# Patient Record
Sex: MALE | Race: WHITE | NOT HISPANIC OR LATINO | Employment: OTHER | URBAN - METROPOLITAN AREA
[De-identification: names, ages, dates, MRNs, and addresses within clinical notes are randomized per-mention and may not be internally consistent; named-entity substitution may affect disease eponyms.]

---

## 2017-07-24 ENCOUNTER — RESOLUTE PROFESSIONAL BILLING HOSPITAL PROF FEE (OUTPATIENT)
Dept: HOSPITALIST | Facility: MEDICAL CENTER | Age: 77
End: 2017-07-24
Payer: MEDICARE

## 2017-07-24 ENCOUNTER — HOSPITAL ENCOUNTER (INPATIENT)
Facility: MEDICAL CENTER | Age: 77
LOS: 7 days | DRG: 064 | End: 2017-07-31
Attending: EMERGENCY MEDICINE | Admitting: HOSPITALIST
Payer: MEDICARE

## 2017-07-24 ENCOUNTER — HOSPITAL ENCOUNTER (OUTPATIENT)
Dept: RADIOLOGY | Facility: MEDICAL CENTER | Age: 77
End: 2017-07-24

## 2017-07-24 ENCOUNTER — APPOINTMENT (OUTPATIENT)
Dept: RADIOLOGY | Facility: MEDICAL CENTER | Age: 77
DRG: 064 | End: 2017-07-24
Attending: EMERGENCY MEDICINE
Payer: MEDICARE

## 2017-07-24 DIAGNOSIS — R56.9 SEIZURE (HCC): ICD-10-CM

## 2017-07-24 DIAGNOSIS — R41.82 ALTERED MENTAL STATUS, UNSPECIFIED ALTERED MENTAL STATUS TYPE: ICD-10-CM

## 2017-07-24 DIAGNOSIS — S06.4XAA EPIDURAL HEMATOMA (HCC): ICD-10-CM

## 2017-07-24 PROBLEM — D72.829 LEUKOCYTOSIS: Status: ACTIVE | Noted: 2017-07-24

## 2017-07-24 PROBLEM — G40.909 SEIZURE DISORDER (HCC): Status: ACTIVE | Noted: 2017-07-24

## 2017-07-24 PROBLEM — D75.89 MACROCYTOSIS WITHOUT ANEMIA: Status: ACTIVE | Noted: 2017-07-24

## 2017-07-24 PROBLEM — R31.29 HEMATURIA, MICROSCOPIC: Status: ACTIVE | Noted: 2017-07-24

## 2017-07-24 PROBLEM — I10 HYPERTENSION: Status: ACTIVE | Noted: 2017-07-24

## 2017-07-24 PROBLEM — E78.5 DYSLIPIDEMIA: Status: ACTIVE | Noted: 2017-07-24

## 2017-07-24 PROBLEM — F02.80 ALZHEIMER'S DEMENTIA (HCC): Status: ACTIVE | Noted: 2017-07-24

## 2017-07-24 PROBLEM — R73.9 HYPERGLYCEMIA: Status: ACTIVE | Noted: 2017-07-24

## 2017-07-24 PROBLEM — G30.9 ALZHEIMER'S DEMENTIA (HCC): Status: ACTIVE | Noted: 2017-07-24

## 2017-07-24 PROBLEM — E87.20 LACTIC ACIDOSIS: Status: ACTIVE | Noted: 2017-07-24

## 2017-07-24 PROBLEM — R80.9 PROTEINURIA: Status: ACTIVE | Noted: 2017-07-24

## 2017-07-24 LAB
ALBUMIN SERPL BCP-MCNC: 4 G/DL (ref 3.2–4.9)
ALBUMIN/GLOB SERPL: 1.4 G/DL
ALP SERPL-CCNC: 102 U/L (ref 30–99)
ALT SERPL-CCNC: 11 U/L (ref 2–50)
ANION GAP SERPL CALC-SCNC: 10 MMOL/L (ref 0–11.9)
APPEARANCE UR: CLEAR
APTT PPP: 24 SEC (ref 24.7–36)
AST SERPL-CCNC: 19 U/L (ref 12–45)
BACTERIA #/AREA URNS HPF: NEGATIVE /HPF
BASOPHILS # BLD AUTO: 0.2 % (ref 0–1.8)
BASOPHILS # BLD: 0.03 K/UL (ref 0–0.12)
BILIRUB SERPL-MCNC: 0.5 MG/DL (ref 0.1–1.5)
BILIRUB UR QL STRIP.AUTO: NEGATIVE
BUN SERPL-MCNC: 18 MG/DL (ref 8–22)
CALCIUM SERPL-MCNC: 8.8 MG/DL (ref 8.5–10.5)
CFT BLD TEG: 3 MIN (ref 5–10)
CHLORIDE SERPL-SCNC: 106 MMOL/L (ref 96–112)
CLOT ANGLE BLD TEG: 64.4 DEGREES (ref 53–72)
CLOT LYSIS 30M P MA LENFR BLD TEG: 0 % (ref 0–8)
CO2 SERPL-SCNC: 21 MMOL/L (ref 20–33)
COLOR UR: YELLOW
CREAT SERPL-MCNC: 1 MG/DL (ref 0.5–1.4)
CT.EXTRINSIC BLD ROTEM: 1.7 MIN (ref 1–3)
CULTURE IF INDICATED INDCX: NO UA CULTURE
EKG IMPRESSION: NORMAL
EOSINOPHIL # BLD AUTO: 0 K/UL (ref 0–0.51)
EOSINOPHIL NFR BLD: 0 % (ref 0–6.9)
EPI CELLS #/AREA URNS HPF: NEGATIVE /HPF
ERYTHROCYTE [DISTWIDTH] IN BLOOD BY AUTOMATED COUNT: 49.8 FL (ref 35.9–50)
GFR SERPL CREATININE-BSD FRML MDRD: >60 ML/MIN/1.73 M 2
GLOBULIN SER CALC-MCNC: 2.8 G/DL (ref 1.9–3.5)
GLUCOSE BLD-MCNC: 162 MG/DL (ref 65–99)
GLUCOSE SERPL-MCNC: 167 MG/DL (ref 65–99)
GLUCOSE UR STRIP.AUTO-MCNC: 100 MG/DL
HCT VFR BLD AUTO: 43.7 % (ref 42–52)
HGB BLD-MCNC: 14.6 G/DL (ref 14–18)
HYALINE CASTS #/AREA URNS LPF: ABNORMAL /LPF
IMM GRANULOCYTES # BLD AUTO: 0.06 K/UL (ref 0–0.11)
IMM GRANULOCYTES NFR BLD AUTO: 0.5 % (ref 0–0.9)
INR PPP: 1.05 (ref 0.87–1.13)
KETONES UR STRIP.AUTO-MCNC: NEGATIVE MG/DL
LACTATE BLD-SCNC: 4.7 MMOL/L (ref 0.5–2)
LEUKOCYTE ESTERASE UR QL STRIP.AUTO: NEGATIVE
LYMPHOCYTES # BLD AUTO: 0.72 K/UL (ref 1–4.8)
LYMPHOCYTES NFR BLD: 5.7 % (ref 22–41)
MAGNESIUM SERPL-MCNC: 1.9 MG/DL (ref 1.5–2.5)
MCF BLD TEG: 64.7 MM (ref 50–70)
MCH RBC QN AUTO: 34.1 PG (ref 27–33)
MCHC RBC AUTO-ENTMCNC: 33.4 G/DL (ref 33.7–35.3)
MCV RBC AUTO: 102.1 FL (ref 81.4–97.8)
MICRO URNS: ABNORMAL
MONOCYTES # BLD AUTO: 1.12 K/UL (ref 0–0.85)
MONOCYTES NFR BLD AUTO: 8.9 % (ref 0–13.4)
NEUTROPHILS # BLD AUTO: 10.63 K/UL (ref 1.82–7.42)
NEUTROPHILS NFR BLD: 84.7 % (ref 44–72)
NITRITE UR QL STRIP.AUTO: NEGATIVE
NRBC # BLD AUTO: 0 K/UL
NRBC BLD AUTO-RTO: 0 /100 WBC
PH UR STRIP.AUTO: 5.5 [PH]
PHENYTOIN SERPL-MCNC: 10.9 UG/ML (ref 10–20)
PLATELET # BLD AUTO: 196 K/UL (ref 164–446)
PMV BLD AUTO: 10 FL (ref 9–12.9)
POTASSIUM SERPL-SCNC: 3.8 MMOL/L (ref 3.6–5.5)
PROT SERPL-MCNC: 6.8 G/DL (ref 6–8.2)
PROT UR QL STRIP: 30 MG/DL
PROTHROMBIN TIME: 14 SEC (ref 12–14.6)
RBC # BLD AUTO: 4.28 M/UL (ref 4.7–6.1)
RBC # URNS HPF: ABNORMAL /HPF
RBC UR QL AUTO: ABNORMAL
SODIUM SERPL-SCNC: 137 MMOL/L (ref 135–145)
SP GR UR STRIP.AUTO: 1.02
TEG ALGORITHM TGALG: ABNORMAL
TSH SERPL DL<=0.005 MIU/L-ACNC: 4.83 UIU/ML (ref 0.3–3.7)
UROBILINOGEN UR STRIP.AUTO-MCNC: 0.2 MG/DL
WBC # BLD AUTO: 12.6 K/UL (ref 4.8–10.8)
WBC #/AREA URNS HPF: ABNORMAL /HPF

## 2017-07-24 PROCEDURE — 87040 BLOOD CULTURE FOR BACTERIA: CPT | Mod: 91

## 2017-07-24 PROCEDURE — 83735 ASSAY OF MAGNESIUM: CPT

## 2017-07-24 PROCEDURE — 304562 HCHG STAT O2 MASK/CANNULA

## 2017-07-24 PROCEDURE — 700105 HCHG RX REV CODE 258: Performed by: EMERGENCY MEDICINE

## 2017-07-24 PROCEDURE — 82962 GLUCOSE BLOOD TEST: CPT

## 2017-07-24 PROCEDURE — 71010 DX-CHEST-PORTABLE (1 VIEW): CPT

## 2017-07-24 PROCEDURE — 85347 COAGULATION TIME ACTIVATED: CPT

## 2017-07-24 PROCEDURE — 85730 THROMBOPLASTIN TIME PARTIAL: CPT

## 2017-07-24 PROCEDURE — 85384 FIBRINOGEN ACTIVITY: CPT

## 2017-07-24 PROCEDURE — 72125 CT NECK SPINE W/O DYE: CPT

## 2017-07-24 PROCEDURE — 700101 HCHG RX REV CODE 250: Performed by: HOSPITALIST

## 2017-07-24 PROCEDURE — 51702 INSERT TEMP BLADDER CATH: CPT

## 2017-07-24 PROCEDURE — 96365 THER/PROPH/DIAG IV INF INIT: CPT

## 2017-07-24 PROCEDURE — 85610 PROTHROMBIN TIME: CPT

## 2017-07-24 PROCEDURE — 99291 CRITICAL CARE FIRST HOUR: CPT

## 2017-07-24 PROCEDURE — 83605 ASSAY OF LACTIC ACID: CPT

## 2017-07-24 PROCEDURE — 93005 ELECTROCARDIOGRAM TRACING: CPT | Performed by: EMERGENCY MEDICINE

## 2017-07-24 PROCEDURE — 80185 ASSAY OF PHENYTOIN TOTAL: CPT

## 2017-07-24 PROCEDURE — 96375 TX/PRO/DX INJ NEW DRUG ADDON: CPT

## 2017-07-24 PROCEDURE — 99223 1ST HOSP IP/OBS HIGH 75: CPT | Performed by: HOSPITALIST

## 2017-07-24 PROCEDURE — 81001 URINALYSIS AUTO W/SCOPE: CPT

## 2017-07-24 PROCEDURE — 770022 HCHG ROOM/CARE - ICU (200)

## 2017-07-24 PROCEDURE — 700101 HCHG RX REV CODE 250: Performed by: EMERGENCY MEDICINE

## 2017-07-24 PROCEDURE — 700111 HCHG RX REV CODE 636 W/ 250 OVERRIDE (IP): Performed by: EMERGENCY MEDICINE

## 2017-07-24 PROCEDURE — 84443 ASSAY THYROID STIM HORMONE: CPT

## 2017-07-24 PROCEDURE — 85576 BLOOD PLATELET AGGREGATION: CPT

## 2017-07-24 PROCEDURE — 303105 HCHG CATHETER EXTRA

## 2017-07-24 PROCEDURE — 80053 COMPREHEN METABOLIC PANEL: CPT

## 2017-07-24 PROCEDURE — 700105 HCHG RX REV CODE 258: Performed by: HOSPITALIST

## 2017-07-24 PROCEDURE — 700111 HCHG RX REV CODE 636 W/ 250 OVERRIDE (IP)

## 2017-07-24 PROCEDURE — 85025 COMPLETE CBC W/AUTO DIFF WBC: CPT

## 2017-07-24 PROCEDURE — 700111 HCHG RX REV CODE 636 W/ 250 OVERRIDE (IP): Performed by: HOSPITALIST

## 2017-07-24 RX ORDER — HYDRALAZINE HYDROCHLORIDE 20 MG/ML
10-20 INJECTION INTRAMUSCULAR; INTRAVENOUS EVERY 4 HOURS PRN
Status: DISCONTINUED | OUTPATIENT
Start: 2017-07-24 | End: 2017-07-31 | Stop reason: HOSPADM

## 2017-07-24 RX ORDER — LORAZEPAM 2 MG/ML
0.5 INJECTION INTRAMUSCULAR EVERY 6 HOURS PRN
Status: DISCONTINUED | OUTPATIENT
Start: 2017-07-24 | End: 2017-07-29

## 2017-07-24 RX ORDER — PHENYTOIN SODIUM 100 MG/1
600 CAPSULE, EXTENDED RELEASE ORAL EVERY EVENING
COMMUNITY

## 2017-07-24 RX ORDER — POLYETHYLENE GLYCOL 3350 17 G/17G
1 POWDER, FOR SOLUTION ORAL
Status: DISCONTINUED | OUTPATIENT
Start: 2017-07-24 | End: 2017-07-31 | Stop reason: HOSPADM

## 2017-07-24 RX ORDER — ONDANSETRON 2 MG/ML
4 INJECTION INTRAMUSCULAR; INTRAVENOUS EVERY 4 HOURS PRN
Status: DISCONTINUED | OUTPATIENT
Start: 2017-07-24 | End: 2017-07-31 | Stop reason: HOSPADM

## 2017-07-24 RX ORDER — ACETAMINOPHEN 325 MG/1
650 TABLET ORAL EVERY 6 HOURS PRN
Status: DISCONTINUED | OUTPATIENT
Start: 2017-07-24 | End: 2017-07-31 | Stop reason: HOSPADM

## 2017-07-24 RX ORDER — MEMANTINE HYDROCHLORIDE 10 MG/1
10 TABLET ORAL 2 TIMES DAILY
COMMUNITY

## 2017-07-24 RX ORDER — LOSARTAN POTASSIUM 100 MG/1
100 TABLET ORAL DAILY
COMMUNITY

## 2017-07-24 RX ORDER — AMLODIPINE BESYLATE 10 MG/1
10 TABLET ORAL DAILY
COMMUNITY

## 2017-07-24 RX ORDER — ATORVASTATIN CALCIUM 80 MG/1
80 TABLET, FILM COATED ORAL
Status: DISCONTINUED | OUTPATIENT
Start: 2017-07-24 | End: 2017-07-31 | Stop reason: HOSPADM

## 2017-07-24 RX ORDER — MIRTAZAPINE 15 MG/1
30 TABLET, FILM COATED ORAL
COMMUNITY

## 2017-07-24 RX ORDER — LORAZEPAM 1 MG/1
0.5 TABLET ORAL EVERY 6 HOURS PRN
Status: DISCONTINUED | OUTPATIENT
Start: 2017-07-24 | End: 2017-07-29

## 2017-07-24 RX ORDER — LORAZEPAM 2 MG/ML
0.5 INJECTION INTRAMUSCULAR ONCE
Status: COMPLETED | OUTPATIENT
Start: 2017-07-24 | End: 2017-07-24

## 2017-07-24 RX ORDER — ONDANSETRON 4 MG/1
4 TABLET, ORALLY DISINTEGRATING ORAL EVERY 4 HOURS PRN
Status: DISCONTINUED | OUTPATIENT
Start: 2017-07-24 | End: 2017-07-31 | Stop reason: HOSPADM

## 2017-07-24 RX ORDER — MIRTAZAPINE 15 MG/1
30 TABLET, FILM COATED ORAL
Status: DISCONTINUED | OUTPATIENT
Start: 2017-07-24 | End: 2017-07-31 | Stop reason: HOSPADM

## 2017-07-24 RX ORDER — AMOXICILLIN 250 MG
2 CAPSULE ORAL 2 TIMES DAILY
Status: DISCONTINUED | OUTPATIENT
Start: 2017-07-24 | End: 2017-07-31 | Stop reason: HOSPADM

## 2017-07-24 RX ORDER — UBIDECARENONE 75 MG
100 CAPSULE ORAL DAILY
COMMUNITY

## 2017-07-24 RX ORDER — ENALAPRILAT 1.25 MG/ML
1.25 INJECTION INTRAVENOUS EVERY 6 HOURS PRN
Status: DISCONTINUED | OUTPATIENT
Start: 2017-07-24 | End: 2017-07-26

## 2017-07-24 RX ORDER — SODIUM CHLORIDE 9 MG/ML
INJECTION, SOLUTION INTRAVENOUS CONTINUOUS
Status: DISCONTINUED | OUTPATIENT
Start: 2017-07-24 | End: 2017-07-25

## 2017-07-24 RX ORDER — FAMOTIDINE 20 MG/1
20 TABLET, FILM COATED ORAL 2 TIMES DAILY
Status: DISCONTINUED | OUTPATIENT
Start: 2017-07-24 | End: 2017-07-25 | Stop reason: ALTCHOICE

## 2017-07-24 RX ORDER — SODIUM CHLORIDE 9 MG/ML
1000 INJECTION, SOLUTION INTRAVENOUS ONCE
Status: COMPLETED | OUTPATIENT
Start: 2017-07-24 | End: 2017-07-24

## 2017-07-24 RX ORDER — LORAZEPAM 2 MG/ML
INJECTION INTRAMUSCULAR
Status: COMPLETED
Start: 2017-07-24 | End: 2017-07-24

## 2017-07-24 RX ORDER — MEMANTINE HYDROCHLORIDE 10 MG/1
10 TABLET ORAL 2 TIMES DAILY
Status: DISCONTINUED | OUTPATIENT
Start: 2017-07-24 | End: 2017-07-31 | Stop reason: HOSPADM

## 2017-07-24 RX ORDER — BISACODYL 10 MG
10 SUPPOSITORY, RECTAL RECTAL
Status: DISCONTINUED | OUTPATIENT
Start: 2017-07-24 | End: 2017-07-31 | Stop reason: HOSPADM

## 2017-07-24 RX ORDER — OMEPRAZOLE 20 MG/1
20 CAPSULE, DELAYED RELEASE ORAL DAILY
COMMUNITY

## 2017-07-24 RX ORDER — ATORVASTATIN CALCIUM 80 MG/1
80 TABLET, FILM COATED ORAL DAILY
COMMUNITY

## 2017-07-24 RX ORDER — LABETALOL HYDROCHLORIDE 5 MG/ML
10 INJECTION, SOLUTION INTRAVENOUS ONCE
Status: COMPLETED | OUTPATIENT
Start: 2017-07-24 | End: 2017-07-24

## 2017-07-24 RX ORDER — OMEPRAZOLE 20 MG/1
20 CAPSULE, DELAYED RELEASE ORAL DAILY
Status: DISCONTINUED | OUTPATIENT
Start: 2017-07-24 | End: 2017-07-31 | Stop reason: HOSPADM

## 2017-07-24 RX ORDER — LEVETIRACETAM 500 MG/1
500 TABLET ORAL 2 TIMES DAILY
Status: ON HOLD | COMMUNITY
End: 2017-07-29

## 2017-07-24 RX ADMIN — SODIUM CHLORIDE 5 MG/HR: 9 INJECTION, SOLUTION INTRAVENOUS at 15:43

## 2017-07-24 RX ADMIN — FAMOTIDINE 20 MG: 10 INJECTION INTRAVENOUS at 20:38

## 2017-07-24 RX ADMIN — DEXTROSE MONOHYDRATE 500 MG: 50 INJECTION, SOLUTION INTRAVENOUS at 20:38

## 2017-07-24 RX ADMIN — SODIUM CHLORIDE 1000 ML: 9 INJECTION, SOLUTION INTRAVENOUS at 14:45

## 2017-07-24 RX ADMIN — LABETALOL HYDROCHLORIDE 10 MG: 5 INJECTION INTRAVENOUS at 15:20

## 2017-07-24 RX ADMIN — SODIUM CHLORIDE 10 MG/HR: 9 INJECTION, SOLUTION INTRAVENOUS at 19:13

## 2017-07-24 RX ADMIN — LORAZEPAM 0.5 MG: 2 INJECTION INTRAMUSCULAR; INTRAVENOUS at 13:24

## 2017-07-24 RX ADMIN — PHENYTOIN SODIUM 300 MG: 50 INJECTION INTRAMUSCULAR; INTRAVENOUS at 21:43

## 2017-07-24 RX ADMIN — LORAZEPAM 0.5 MG: 2 INJECTION INTRAMUSCULAR; INTRAVENOUS at 22:04

## 2017-07-24 RX ADMIN — SODIUM CHLORIDE 12.5 MG/HR: 9 INJECTION, SOLUTION INTRAVENOUS at 21:20

## 2017-07-24 RX ADMIN — SODIUM CHLORIDE 15 MG/HR: 9 INJECTION, SOLUTION INTRAVENOUS at 23:08

## 2017-07-24 RX ADMIN — SODIUM CHLORIDE: 9 INJECTION, SOLUTION INTRAVENOUS at 17:10

## 2017-07-24 RX ADMIN — LORAZEPAM 0.5 MG: 2 INJECTION INTRAMUSCULAR at 13:53

## 2017-07-24 RX ADMIN — LORAZEPAM 0.5 MG: 2 INJECTION INTRAMUSCULAR; INTRAVENOUS at 13:53

## 2017-07-24 ASSESSMENT — PAIN SCALES - GENERAL: PAINLEVEL_OUTOF10: ASSUMED PAIN PRESENT

## 2017-07-24 ASSESSMENT — COPD QUESTIONNAIRES
DURING THE PAST 4 WEEKS HOW MUCH DID YOU FEEL SHORT OF BREATH: NONE/LITTLE OF THE TIME
COPD SCREENING SCORE: 4
HAVE YOU SMOKED AT LEAST 100 CIGARETTES IN YOUR ENTIRE LIFE: YES
DO YOU EVER COUGH UP ANY MUCUS OR PHLEGM?: NO/ONLY WITH OCCASIONAL COLDS OR INFECTIONS

## 2017-07-24 ASSESSMENT — LIFESTYLE VARIABLES: EVER_SMOKED: YES

## 2017-07-24 ASSESSMENT — ENCOUNTER SYMPTOMS
SEIZURES: 1
NECK PAIN: 1

## 2017-07-24 NOTE — ED PROVIDER NOTES
ED Provider Note    Scribed for George Moreno M.D. by Eric Sampson. 7/24/2017, 12:56 PM.    Primary care provider: None noted  Means of arrival: EMS  History obtained from: EMS and patient  History limited by: Altered level of consciousness    CHIEF COMPLAINT  Chief Complaint   Patient presents with   • ALOC       HPI  Alex Bond is a 77 y.o. Male with a history of seizures who presents to the Emergency Department via EMS for evaluation after having seizures just prior to arrival. Per medical records, patient had witnessed seizure in bed causing him to slip off of the bed. It is unclear if he hit his head. Patient was at [facility] for epidural hematoma. He currently complains of associated neck pain onset one hour ago. Patient appears confused and cannot recall what happened that caused the neck pain. He also reports generalized body pain. Patient is on dilantin for the seizures. He also has history of hypertension, hyperlipidemia, and previous CVA.    History is limited secondary to patient's altered level of consciousness.    REVIEW OF SYSTEMS  Review of Systems   Constitutional:        Positive generalized body pain   Musculoskeletal: Positive for neck pain.   Neurological: Positive for seizures.        Positive confusion     C    ROS is limited secondary to patient's altered level of consciousness.    PAST MEDICAL HISTORY   has a past medical history of Seizure disorder (CMS-HCC); Hypertension; High cholesterol; and Alzheimer disease.History of seizures    SURGICAL HISTORY  patient denies any surgical history    SOCIAL HISTORY  None noted    FAMILY HISTORY  None noted    CURRENT MEDICATIONS  Current medications can be seen on the nurse's note.   No current facility-administered medications on file prior to encounter.     No current outpatient prescriptions on file prior to encounter.     Location of the nurse's notes and have been reviewed.      ALLERGIES  Allergies   Allergen  "Reactions   • Other Misc      Hay fever       PHYSICAL EXAM  VITAL SIGNS: /110 mmHg  Pulse 108  Resp 20  Ht 1.829 m (6' 0.01\")  Wt 90.719 kg (200 lb)  BMI 27.12 kg/m2  Vitals reviewed.  Constitutional: Awake, alert, chronically ill-appearing. No acute distress.  Appears uncomfortable  HENT: Normocephalic, Atraumatic, Bilateral external ears normal, Oropharynx moist, No oral exudates, Nose normal.   Eyes: PERRL, EOMI, Conjunctiva normal, No discharge.   Neck: Midline tenderness complaining of pain  Cardiovascular: Normal heart rate, Normal rhythm, No murmurs, No rubs, No gallops.   Thorax & Lungs: Normal breath sounds, No respiratory distress, No wheezing, No chest tenderness.   Abdomen: Bowel sounds normal, Soft, No tenderness  Skin: Warm, Dry, No erythema, No rash.   Back: No tenderness, No CVA tenderness.   Musculoskeletal: Erosive 40.  Normal range of motion passively.  Neurologic: Alert, disoriented, moves all extremities does not answer questions and follow commands.  Psychiatric: Affect, agitated    LABS  Results for orders placed or performed during the hospital encounter of 07/24/17   CBC WITH DIFFERENTIAL   Result Value Ref Range    WBC 12.6 (H) 4.8 - 10.8 K/uL    RBC 4.28 (L) 4.70 - 6.10 M/uL    Hemoglobin 14.6 14.0 - 18.0 g/dL    Hematocrit 43.7 42.0 - 52.0 %    .1 (H) 81.4 - 97.8 fL    MCH 34.1 (H) 27.0 - 33.0 pg    MCHC 33.4 (L) 33.7 - 35.3 g/dL    RDW 49.8 35.9 - 50.0 fL    Platelet Count 196 164 - 446 K/uL    MPV 10.0 9.0 - 12.9 fL    Neutrophils-Polys 84.70 (H) 44.00 - 72.00 %    Lymphocytes 5.70 (L) 22.00 - 41.00 %    Monocytes 8.90 0.00 - 13.40 %    Eosinophils 0.00 0.00 - 6.90 %    Basophils 0.20 0.00 - 1.80 %    Immature Granulocytes 0.50 0.00 - 0.90 %    Nucleated RBC 0.00 /100 WBC    Neutrophils (Absolute) 10.63 (H) 1.82 - 7.42 K/uL    Lymphs (Absolute) 0.72 (L) 1.00 - 4.80 K/uL    Monos (Absolute) 1.12 (H) 0.00 - 0.85 K/uL    Eos (Absolute) 0.00 0.00 - 0.51 K/uL    Baso " (Absolute) 0.03 0.00 - 0.12 K/uL    Immature Granulocytes (abs) 0.06 0.00 - 0.11 K/uL    NRBC (Absolute) 0.00 K/uL   COMP METABOLIC PANEL   Result Value Ref Range    Sodium 137 135 - 145 mmol/L    Potassium 3.8 3.6 - 5.5 mmol/L    Chloride 106 96 - 112 mmol/L    Co2 21 20 - 33 mmol/L    Anion Gap 10.0 0.0 - 11.9    Glucose 167 (H) 65 - 99 mg/dL    Bun 18 8 - 22 mg/dL    Creatinine 1.00 0.50 - 1.40 mg/dL    Calcium 8.8 8.5 - 10.5 mg/dL    AST(SGOT) 19 12 - 45 U/L    ALT(SGPT) 11 2 - 50 U/L    Alkaline Phosphatase 102 (H) 30 - 99 U/L    Total Bilirubin 0.5 0.1 - 1.5 mg/dL    Albumin 4.0 3.2 - 4.9 g/dL    Total Protein 6.8 6.0 - 8.2 g/dL    Globulin 2.8 1.9 - 3.5 g/dL    A-G Ratio 1.4 g/dL   LACTIC ACID   Result Value Ref Range    Lactic Acid 4.7 (HH) 0.5 - 2.0 mmol/L   PROTHROMBIN TIME (INR)   Result Value Ref Range    PT 14.0 12.0 - 14.6 sec    INR 1.05 0.87 - 1.13   APTT   Result Value Ref Range    APTT 24.0 (L) 24.7 - 36.0 sec   BASIC TEG   Result Value Ref Range    Reaction Time Initial-R 3.0 (L) 5.0 - 10.0 min    Clot Kinetics-K 1.7 1.0 - 3.0 min    Clot Angle-Angle 64.4 53.0 - 72.0 degrees    Maximum Clot Strength-MA 64.7 50.0 - 70.0 mm    Lysis 30 minutes-LY30 0.0 0.0 - 8.0 %    TEG Algorithm Link Algorithm    DILANTIN   Result Value Ref Range    Phenytoin 10.9 10.0 - 20.0 ug/mL   ESTIMATED GFR   Result Value Ref Range    GFR If African American >60 >60 mL/min/1.73 m 2    GFR If Non African American >60 >60 mL/min/1.73 m 2   EKG (ER)   Result Value Ref Range    Report       [facility] Emergency Dept.    Test Date:  2017  Pt Name:    LYNNE PACE              Department: ER  MRN:        [id number]                      Room:        13  Gender:     M                            Technician: [id number]  :        [id number]                   Requested By:ER TRIAGE PROTOCOL  Order #:    [id number]                    Reading MD:    Measurements  Intervals                                 Axis  Rate:       84                           P:          0  WV:         155                          QRS:        -72  QRSD:       136                          T:          16  QT:         444  QTc:        525    Interpretive Statements  SINUS RHYTHM  RIGHT BUNDLE BRANCH BLOCK  No previous ECG available for comparison        All labs reviewed by me.    EKG Interpretation  Interpreted by me    Rhythm:  Normal sinus rhythm   Rate: 84  Axis: normal  Ectopy: none  Conduction: Right bundle-branch block with secondary changes  ST Segments: Right bundle branch block with secondary changes  T Waves: no acute change  Q Waves: none  Clinical Impression: Normal sinus rhythm with right bundle-branch block and secondary changes     RADIOLOGY  CT-CSPINE WITHOUT PLUS RECONS   Final Result      1.  No acute abnormality identified.   2.  Multilevel multifactorial degenerative changes   3.  Osteopenia   4.  Atherosclerosis   5.  Probable prior RIGHT carotid endarterectomy      DX-CHEST-PORTABLE (1 VIEW)   Final Result      Cardiomegaly.      OUTSIDE IMAGES-CT HEAD   Preliminary Result        The radiologist's interpretation of all radiological studies have been reviewed by me.    COURSE & MEDICAL DECISION MAKING  Pertinent Labs & Imaging studies reviewed. (See chart for details)    Obtained and reviewed past medical records from the transferring hospital.  Including labs, x-rays and workup.    12:56 PM Patient seen and examined at bedside. The patient presents with altered level of consciousness, and the differential diagnosis includes but is not limited to , epidural hematoma, seizure, status epilepticus septicemic abnormalities.  Noncompliance. Ordered for CT C spine, DX chest, dilantin, CBC, CMP, lactic acid, urinalysis culture, INR, APTT, basic TEG, EKG to evaluate. Patient will be treated with ativan 0.5 mg for his symptoms.      12:59 PM Reviewed medical records which states patient has a history of seizures, hypertension,  hyperlipidemia, and previous CVA. He takes dilantin for seizures. Patient had witnessed seizure while on his bed, causing him to slip off of the bed. He was at [facility] for epidural hematoma.     1:05 PM Paged neurosurgery    1:14 PM - I discussed the patient's case and the above findings with Dr. Marti (neurosurgery) who advised admission to ICU. Also advised to discuss patient's case and above findings with trauma.    2:10 PM Paged trauma     2:12 PM I discussed the patient's case and the above findings with trauma .  Dr. Carr is seen and evaluated the patient and recommends admission to the hospitalist.    2:31 PM Paged hospitalist    Patient was transferred here for a epidural hematoma.  I have reviewed these images discussed the case with the trauma surgeon and the neurosurgeon.  The patient will be admitted to the hospitalist.  He is quite agitated given Ativan when necessary.  His blood pressure is noted to be high.  He is given small doses of labetalol.  A  teg was ordered.      The patient did have an elevated lactic acid.  I suspect this is likely secondary to seizures and not sepsis.  Did culture him.  He does not have a leukocytosis or fever, urinalysis and chest x-ray were unremarkable.  Phenytoin is therapeutic.  His artery on Ativan for sedation, which did help minimize risk of further seizures    His neck pain and the neck CT this is unremarkable for fracture.  The patient will be admitted to the hospital setting critical condition.    DISPOSITION:  Patient will be admitted to Dr. Lopez in critical condition.     FINAL IMPRESSION  1. Epidural hematoma (CMS-HCC)    2. Altered mental status, unspecified altered mental status type    3. Seizure (CMS-HCC)    4.  Critical care time of 40 minutes not including procedures      I, Eric Sampson (Lyle), am scribing for, and in the presence of, George Moreno M.D..    Electronically signed by: Eric Sampson (Lyle),  7/24/2017    IGeorge M.D. personally performed the services described in this documentation, as scribed by Eric Sampson in my presence, and it is both accurate and complete.    The note accurately reflects work and decisions made by me.  George Moreno  7/24/2017  4:52 PM

## 2017-07-24 NOTE — H&P
Hospital Medicine History and Physical    Date of Service  2017    Chief Complaint  Chief Complaint   Patient presents with   • ALOC       History of Presenting Illness  77 y.o. male who presented 2017 with altered level of consciousness. Patient is from [place]. He has known Alzheimer's dementia which has been stable he's on vacation with his wife at [place]. The patient suddenly developed altered level of consciousness he does have history of seizures. He will continue at this point with Keppra and Dilantin. On evaluation of his head with a imaging study he is found to have a subdural hematoma. Neurosurgery has evaluated the patient a do not feel that at this point surgery is indicated. Patient will be admitted to ICU for continued neuro watch. MRI of the brain will be requested for morning.    Primary Care Physician  No primary care provider on file.    Consultants  Neurosurgery    Code Status  Full code    Review of Systems  ROS unable to obtain due to patient's current mental status.    Past Medical History  Past Medical History   Diagnosis Date   • Seizure disorder (CMS-HCC)    • Hypertension    • High cholesterol    • Alzheimer disease        Surgical History  History reviewed. No pertinent past surgical history.    Medications  No current facility-administered medications on file prior to encounter.     No current outpatient prescriptions on file prior to encounter.       Family History  History reviewed. No pertinent family history.    Social History  Social History   Substance Use Topics   • Smoking status: Never Smoker    • Smokeless tobacco: None   • Alcohol Use: No       Allergies  Allergies   Allergen Reactions   • Other Misc      Hay fever        Physical Exam  Laboratory   Hemodynamics  Temp (24hrs), Av.5 °C (97.7 °F), Min:36.5 °C (97.7 °F), Max:36.5 °C (97.7 °F)   Temperature: 36.5 °C (97.7 °F)  Pulse  Av.5  Min: 79  Max: 109 Heart Rate (Monitored): 83  Blood Pressure :  149/110 mmHg, NIBP: (!) 173/84 mmHg      Respiratory      Respiration: 16, Pulse Oximetry: 97 %, O2 Daily Delivery Respiratory : Silicone Nasal Cannula     Work Of Breathing / Effort: Mild  RLL Breath Sounds: Diminished, LLL Breath Sounds: Diminished    Physical Exam  Patient currently is not alert not awake not oriented. Head is atraumatic. I have attempted to arouse the patient. It is very difficult for him to open his eyes even with voice. Once I did arouse the patient he was able to move his upper extremities and lower extremities. But he follows commands with just minimal intention.  Eyes do not follow a normal range of gaze. Pupils are equal round and reactive. Sclerae anicteric conjunctiva is within normal hue.  Nose is midline without discharge no nasal fracture known bleeding from the nose.  Ears bilaterally intact without any discharge external ears are normal mastoid tenderness is not appreciated ear canals are patent.  Oral cavity moist mucous membranes no apparent focus of infection the oral cavity poor dentition.  Neck soft supple no JVD carotid bruit with carotid bruits thyroid or lymphadenopathy appreciated. He is in a cervical collar.  Chest wall moves equally with inspiration and expiration of paradoxical motion no reversible chest wall tenderness.  Heart S1-S2 no murmurs or gallops detected.  Lungs clear to auscultation no rales or rhonchi detected.  Abdomen soft bowel sounds present all 4 quadrants no hepatomegaly no splenomegaly no rebound no tenderness elicited.  Genital exam within normal limits  Wells catheter has been inserted.  Rectal exam deferred.  Upper and lower extremities good pulses good muscles tone normal deep tendon reflexes. He is not following commands  Skin normal turgor no rashes or abrasions identified.  Neurologically cranial nerves II-12 could not be fully evaluated as the patient does not cooperate.   Recent Labs      07/24/17   1312   WBC  12.6*   RBC  4.28*   HEMOGLOBIN   14.6   HEMATOCRIT  43.7   MCV  102.1*   MCH  34.1*   MCHC  33.4*   RDW  49.8   PLATELETCT  196   MPV  10.0     Recent Labs      07/24/17   1312   SODIUM  137   POTASSIUM  3.8   CHLORIDE  106   CO2  21   GLUCOSE  167*   BUN  18   CREATININE  1.00   CALCIUM  8.8     Recent Labs      07/24/17   1312   ALTSGPT  11   ASTSGOT  19   ALKPHOSPHAT  102*   TBILIRUBIN  0.5   GLUCOSE  167*     Recent Labs      07/24/17   1312   APTT  24.0*   INR  1.05             No results found for: TROPONINI    Imaging  CT-CSPINE WITHOUT PLUS RECONS   Final Result      1.  No acute abnormality identified.   2.  Multilevel multifactorial degenerative changes   3.  Osteopenia   4.  Atherosclerosis   5.  Probable prior RIGHT carotid endarterectomy      DX-CHEST-PORTABLE (1 VIEW)   Final Result      Cardiomegaly.      OUTSIDE IMAGES-CT HEAD   Preliminary Result         Assessment/Plan     I anticipate this patient will require at least two midnights for appropriate medical management, necessitating inpatient admission.    * Epidural hematoma (CMS-HCC) (present on admission)  Assessment & Plan  Patient will be admitted to the ICU. He will undergo every hour neuro checks. Neurosurgical consultation has been requested currently no intervention is planned. Obtain MRI in the morning. Keep off blood thinners. Monitor for neuro deficits.    Seizure disorder (CMS-HCC) (present on admission)  Assessment & Plan  Continue with Keppra IV and Dilantin IV.  Patient will continue on seizure prophylaxis.  Continue with neuro checks every hour.    Hypertension (present on admission)  Assessment & Plan  Continue with permissive hypertension however control if the blood pressure goes above 160 and do not let her go below 100.    Leukocytosis (present on admission)  Assessment & Plan  Most likely stress reaction monitor white blood cell count.      Lactic acidosis (present on admission)  Assessment & Plan  Do not suspect infectious cause this is most likely  secondary to seizure disorder.    Dyslipidemia (present on admission)  Assessment & Plan  Low-fat low-cholesterol diet and statin.    Alzheimer's dementia (present on admission)  Assessment & Plan  Severe and he is combative with his current condition and I have discussed it with the family in detail.    Hyperglycemia (present on admission)  Assessment & Plan  Monitor blood sugars and if necessary sliding scale with Accu-Cheks will be utilized.    Macrocytosis without anemia (present on admission)  Assessment & Plan  Check B12 and folic acid level.    Hematuria, microscopic (present on admission)  Assessment & Plan  Monitor for worsening will need eventual ultrasound of the bladder and kidneys.    Proteinuria (present on admission)  Assessment & Plan  Monitor urine analysis, continue with ACE inhibitor.      VTE prophylaxis: Sequential .

## 2017-07-24 NOTE — IP AVS SNAPSHOT
7/31/2017    Alex Bond  [address] [place] [address]    Dear Alex:    [facility] wants to ensure your discharge home is safe and you or your loved ones have had all of your questions answered regarding your care after you leave the hospital.    Below is a list of resources and contact information should you have any questions regarding your hospital stay, follow-up instructions, or active medical symptoms.    Questions or Concerns Regarding… Contact   Medical Questions Related to Your Discharge  (7 days a week, 8am-5pm) Contact a Nurse Care Coordinator   [phone]   Medical Questions Not Related to Your Discharge  (24 hours a day / 7 days a week)  Contact the Nurse Health Line   [phone]    Medications or Discharge Instructions Refer to your discharge packet   or contact your [facility] Primary Care Provider   [phone]   Follow-up Appointment(s) Schedule your appointment via [employer]   or contact Scheduling [phone]   Billing Review your statement via [employer]  or contact Billing [phone]   Medical Records Review your records via [employer]   or contact Medical Records [phone]     You may receive a telephone call within two days of discharge. This call is to make certain you understand your discharge instructions and have the opportunity to have any questions answered. You can also easily access your medical information, test results and upcoming appointments via the [employer] free online health management tool. You can learn more and sign up at [email or web address]. For assistance setting up your [employer] account, please call [phone].    Once again, we want to ensure your discharge home is safe and that you have a clear understanding of any next steps in your care. If you have any questions or concerns, please do not hesitate to contact us, we are here for you. Thank you for choosing [facility] for your healthcare needs.    Sincerely,    Your [facility] Healthcare Team

## 2017-07-24 NOTE — ED NOTES
"Pt spouse at bedside. Per spouse pt was in bed and \"slipped\" out of bed to floor, denies that pt hit head. Spouse states pt had seizure. Spouse called 911, spouse states pt had another seizure en route to [facility]. Pt spouse states pt is normally a/o x4 but is \"hard to handle.\" per spouse pt has new onset of dementia.   "

## 2017-07-24 NOTE — IP AVS SNAPSHOT
Questra Access Code: [email or web address]  Expires: 8/30/2017  1:05 PM    Your email address is not on file at [facility].  Email Addresses are required for you to sign up for Questra, please contact [phone] to verify your personal information and to provide your email address prior to attempting to register for Questra.    Alex Varun  [address] [place] [address]    Questra  A secure, online tool to manage your health information     [facility]’s Questra® is a secure, online tool that connects you to your personalized health information from the privacy of your home -- day or night - making it very easy for you to manage your healthcare. Once the activation process is completed, you can even access your medical information using the Questra naun, which is available for free in the Apple Naun store or Google Play store.     To learn more about Questra, visit [email or web address]    There are two levels of access available (as shown below):   My Chart Features  [facility] Primary Care Doctor [facility]  Specialists [facility]  Urgent  Care Non-[facility] Primary Care Doctor   Email your healthcare team securely and privately 24/7 X X X    Manage appointments: schedule your next appointment; view details of past/upcoming appointments X      Request prescription refills. X      View recent personal medical records, including lab and immunizations X X X X   View health record, including health history, allergies, medications X X X X   Read reports about your outpatient visits, procedures, consult and ER notes X X X X   See your discharge summary, which is a recap of your hospital and/or ER visit that includes your diagnosis, lab results, and care plan X X  X     How to register for Questra:  Once your e-mail address has been verified, follow the following steps to sign up for Questra.     1. Go to  [email or web address]  2. Click on the Sign Up Now box, which takes you to the New Member Sign Up page. You will  need to provide the following information:  a. Enter your [employer] Access Code exactly as it appears at the top of this page. (You will not need to use this code after you’ve completed the sign-up process. If you do not sign up before the expiration date, you must request a new code.)   b. Enter your date of birth.   c. Enter your home email address.   d. Click Submit, and follow the next screen’s instructions.  3. Create a Client24t ID. This will be your Fl[employer] login ID and cannot be changed, so think of one that is secure and easy to remember.  4. Create a [employer] password. You can change your password at any time.  5. Enter your Password Reset Question and Answer. This can be used at a later time if you forget your password.   6. Enter your e-mail address. This allows you to receive e-mail notifications when new information is available in [employer].  7. Click Sign Up. You can now view your health information.    For assistance activating your [employer] account, call [phone]

## 2017-07-24 NOTE — ASSESSMENT & PLAN NOTE
"- CT head 07/25/2017 revealing \"Stable appearing left lateral sylvian frontal-temporal hemorrhage most consistent with a small subdural hematoma. Measures 10 mm with no significant change from 7/24/2017. Moderate cerebral atrophy. Old lacunar infarcts. Advanced supratentorial white matter disease most consistent with microvascular ischemic change. Probable old MCA territory infarcts with encephalomalacic change in the right hemisphere primarily in the right frontal operculum. No evidence of new hemorrhage since the prior exam\"  - Continue q4 hour neuro checks  - No surgical interventions per NS team  - Stat CT head if any change in neurological status  - Outpatient CT in two week with PCP  - Keep SBP < 150  - Hold ASA until repeat CT head in outpatient setting  "

## 2017-07-24 NOTE — ASSESSMENT & PLAN NOTE
- Would check B12 / Folate but patient refusing blood draws  - With underlying dementia would recommend this  - Recommend PCP review the case

## 2017-07-24 NOTE — ASSESSMENT & PLAN NOTE
- Increased Keppra to 750 mg BID  - Continue phenytoin ER  - Outpatient neurology evaluation post discharge

## 2017-07-24 NOTE — CONSULTS
TRAUMA HISTORY AND PHYSICAL    CHIEF COMPLAINT: Epidural hematoma    HISTORY OF PRESENT ILLNESS: The patient is a 77-year-old man who was not triaged as a trauma. He has a known history of seizures. He apparently had a seizure and rolled out of bed earlier today and fell out of the bed. It's unclear whether or not he hit his head but an outside CT did reveal an epidural hematoma. He is transferred here for further care..     PAST MEDICAL HISTORY:  has a past medical history of Seizure disorder (CMS-HCC); Hypertension; and High cholesterol.     PAST SURGICAL HISTORY: Unknown     ALLERGIES: NKMA.     CURRENT MEDICATIONS:    Home Medications     Reviewed by Lata Berman, Pharmacy Int (Pharmacy Intern) on 07/24/17 at 1413  Med List Status: Complete    Medication Last Dose Status    amlodipine (NORVASC) 10 MG Tab 7/23/2017 Active    aspirin EC (ECOTRIN) 81 MG Tablet Delayed Response 7/23/2017 Active    atorvastatin (LIPITOR) 80 MG tablet 7/23/2017 Active    Calcium Carbonate-Vit D-Min (CALCIUM 1200 PO) 7/23/2017 Active    cyanocobalamin (VITAMIN B-12) 100 MCG Tab 7/23/2017 Active    levetiracetam (KEPPRA) 500 MG Tab 7/23/2017 Active    losartan (COZAAR) 100 MG Tab 7/23/2017 Active    memantine (NAMENDA) 10 MG Tab 7/23/2017 Active    metoprolol (LOPRESSOR) 25 MG Tab 7/23/2017 Active    mirtazapine (REMERON) 15 MG Tab 7/23/2017 Active    omeprazole (PRILOSEC) 20 MG delayed-release capsule 7/23/2017 Active    phenytoin ER (DILANTIN) 100 MG Cap 7/23/2017 Active                FAMILY HISTORY: family history is not on file.    SOCIAL HISTORY:  reports that he has never smoked. He does not have any smokeless tobacco history on file. He reports that he does not drink alcohol or use illicit drugs.    REVIEW OF SYSTEMS: Unable to be elicited secondary to the acuity of the patient's condition.    PHYSICAL EXAMINATION:     CONSTITUTIONAL:     Vital Signs: Blood pressure 149/110, pulse 103, temperature 36.5 °C (97.7 °F), resp.  "rate 12, height 1.829 m (6' 0.01\"), weight 90.719 kg (200 lb), SpO2 95 %.   General Appearance: is in mild distress.  HEENT:     The nares and oropharynx are clear. The midface and jaw are stable. No malocclusion is evident.  NECK:    The cervical spine is immobilized with a collar. The trachea is midline. There is no jugulovenous distention or cervical crepitance.   RESPIRATORY:   Inspection: unlabored respirations, no intercostal retractions or accessory muscle use.   Palpation:  nontender. The clavicles are nondeformed.   Auscultation: normal.  CARDIOVASCULAR:   Auscultation: Regular rhythm with tachycardia.   Peripheral Pulses: Radial and femoral pulses palpable  ABDOMEN: Abdomen is soft, nontender, without organomegaly or masses.    MUSCULOSKELETAL:  . The pelvis is stable.    inspection of back is normal.  SKIN:    Warm, moist.  NEUROLOGIC:    GCS 15. no focal deficits noted.  PSYCHIATRIC:   Unable to assess.    LABORATORY VALUES:   Recent Labs      07/24/17   1312   WBC  12.6*   RBC  4.28*   HEMOGLOBIN  14.6   HEMATOCRIT  43.7   MCV  102.1*   MCH  34.1*   MCHC  33.4*   RDW  49.8   PLATELETCT  196   MPV  10.0     Recent Labs      07/24/17   1312   SODIUM  137   POTASSIUM  3.8   CHLORIDE  106   CO2  21   GLUCOSE  167*   BUN  18   CREATININE  1.00   CALCIUM  8.8     Recent Labs      07/24/17   1312   ASTSGOT  19   ALTSGPT  11   TBILIRUBIN  0.5   ALKPHOSPHAT  102*   GLOBULIN  2.8   INR  1.05     Recent Labs      07/24/17   1312   APTT  24.0*   INR  1.05        IMAGING:   DX-CHEST-PORTABLE (1 VIEW)   Final Result      Cardiomegaly.      OUTSIDE IMAGES-CT HEAD   Preliminary Result      CT-CSPINE WITHOUT PLUS RECONS    (Results Pending)       IMPRESSION AND PLAN:   77-year-old man status post a seizure and subsequent fall out of bed. He was not triaged as a trauma patient. He is noted to have an epidural hematoma. He also has evidence of ongoing significant medical illness with an elevated lactic acid and an overall " ill appearance clinically. Given his mechanism, which is a fall out of bed, and multiple medical comorbidities as well as ongoing likely acute illness he will best be served by admission to the medicine service. He does have a small epidural which has been evaluated by Dr. fitch of the neurosurgery service. The plan is to manage this nonoperatively. I will certainly check on him again to ensure that there are no occult problems which would benefit from input or intervention by the trauma service.    DISPOSITION: Medicine service    CRITICAL CARE TIME: 21 minutes excluding procedures.  ____________________________________   Derek Carr M.D.    DD: 7/24/2017  2:43 PM

## 2017-07-24 NOTE — IP AVS SNAPSHOT
" Home Care Instructions                                                                                                                  Name:Alex Bond  Medical Record Number:[id number]  CSN: [id number]    YOB: 1940   Age: 77 y.o.  Sex: male  HT:1.829 m (6' 0.01\") WT: 86.2 kg (190 lb 0.6 oz)          Admit Date: 7/24/2017     Discharge Date:   Today's Date: 7/31/2017  Attending Doctor:  Tan Armstrong M.D.                  Allergies:  Other misc            Discharge Instructions       Discharge Instructions    Things to follow up after discharge:   1) Follow up with your PCP within one week of hospital discharge.     2) You have a small bleed in your head called a sub dural hematoma. Your PCP should repeat a CT of the head in two weeks of hospital discharge to ensure this remains stable.     3) Increase your dose of keppra to 750 mg twice daily given you had seizures prior to coming to hospital. Prescriptions provided. Discuss with your neurology and PCP.      4) Discuss your hospital stay with PCP and have PCP obtain records for this hospital stay.     5) You are noted to have low blood counts. Have PCP monitor this.     6) Have PCP obtain renal function, electrolytes in one week of hospital discharge.     7) Stop aspirin for now. Resume if repeat CT of the head is stable. Discuss with your PCP.     8) You are noted to have Fluid on lungs called pulmonary edema. Have your PCP repeat a Chest XRAY and consider an echocardiogram if not obtained recently.     9) Monitor your blood pressures at home. New blood pressure medications are added which include Carvedilol 25 mg twice daily (stop metoprolol). In addition we have also added Isosorbide mononitrate. Have PCP continue to monitor your blood pressures.     10) You are noted to have some swallow deficits. You should be on Dysphagia I diet with NECTAR THICK LIQUIDS. Your nursing staff on discharge will discuss dietary modifications with you.   11) Continue " oxygen post discharge. Discuss with your PCP. You will need oxygen test performed by your PCP once [place] to see if you need ongoing oxygen.   12) Continue Physical therapy, occupation therapy and speech therapy with your home health team.     DISCHARGE NURSING COMMUNICATION Start: 07/29/17 1312, CONTINUOUS, ROUTINE     Comments: 1) Follow up with your PCP in one week of hospital discharge.     2) You have a small bleed in your head called a sub dural hematoma. Your PCP should repeat a CT of the head in two weeks of hospital discharge to ensure this remains stable.     3) Increase your dose of keppra to 750 mg twice daily. Prescriptions provided.     4) Discuss your hospital stay with PCP and have PCP obtain records for this hospital stay.     5) You are noted to have low blood counts. Have PCP monitor this.     6) Have PCP obtain renal function, electrolytes in one week of hospital discharge.     7) Stop aspirin for now. Resume if repeat CT of the head is stable. Discuss with your PCP.     8) You are noted to have Fluid on lungs called pulmonary edema. Have your PCP repeat a Chest XRAY and consider an echocardiogram if not obtained recently.     9) Monitor your blood pressures at home. New blood pressure medications are added which include Carvedilol 25 mg twice daily (stop metoprolol). In addition we have also added Isosorbide mononitrate. Have PCP continue to monitor your blood pressures.     10) You are noted to have some swallow deficits. You should be on Dysphagia I diet with NECTAR THICK LIQUIDS. Your nursing staff on discharge will discuss dietary modifications with you.                     Discharged to home by car with relative. Discharged via wheelchair, hospital escort: Yes.  Special equipment needed: Not Applicable    Be sure to schedule a follow-up appointment with your primary care doctor or any specialists as instructed.     Discharge Plan:   Diet Plan: Discussed  Activity Level:  Discussed  Confirmed Follow up Appointment: Patient to Call and Schedule Appointment  Confirmed Symptoms Management: Discussed  Medication Reconciliation Updated: Yes  Influenza Vaccine Indication: Not indicated: Previously immunized this influenza season and > 8 years of age, Patient Refuses    I understand that a diet low in cholesterol, fat, and sodium is recommended for good health. Unless I have been given specific instructions below for another diet, I accept this instruction as my diet prescription.   Other diet: Dysphasia 1 with nectar thickened liquid    Special Instructions: None    · Is patient discharged on Warfarin / Coumadin?   No     · Is patient Post Blood Transfusion?  No    Depression / Suicide Risk    As you are discharged from this [facility] facility, it is important to learn how to keep safe from harming yourself.    Recognize the warning signs:  · Abrupt changes in personality, positive or negative- including increase in energy   · Giving away possessions  · Change in eating patterns- significant weight changes-  positive or negative  · Change in sleeping patterns- unable to sleep or sleeping all the time   · Unwillingness or inability to communicate  · Depression  · Unusual sadness, discouragement and loneliness  · Talk of wanting to die  · Neglect of personal appearance   · Rebelliousness- reckless behavior  · Withdrawal from people/activities they love  · Confusion- inability to concentrate     If you or a loved one observes any of these behaviors or has concerns about self-harm, here's what you can do:  · Talk about it- your feelings and reasons for harming yourself  · Remove any means that you might use to hurt yourself (examples: pills, rope, extension cords, firearm)  · Get professional help from the community (Mental Health, Substance Abuse, psychological counseling)  · Do not be alone:Call your Safe Contact- someone whom you trust who will be there for you.  · Call your local  CRISIS HOTLINE 765-7501 or 667-980-5453  · Call your local Children's Mobile Crisis Response Team [place] (347) 488-5775 or www.[employer]  · Call the toll free National Suicide Prevention Hotlines   · National Suicide Prevention Lifeline 587-947-QPOE (3680)  · National Hope Line Network 800-SUICIDE (867-7334)           Discharge Medication Instructions:    Below are the medications your physician expects you to take upon discharge:    Review all your home medications and newly ordered medications with your doctor and/or pharmacist. Follow medication instructions as directed by your doctor and/or pharmacist.    Please keep your medication list with you and share with your physician.               Medication List      START taking these medications        Instructions    Morning Afternoon Evening Bedtime    carvedilol 25 MG Tabs   Last time this was given:  25 mg on 7/31/2017  8:48 AM   Commonly known as:  COREG        Take 1 Tab by mouth 2 times a day, with meals.   Dose:  25 mg                        isosorbide mononitrate SR 60 MG Tb24   Last time this was given:  60 mg on 7/31/2017  8:48 AM   Commonly known as:  IMDUR        Take 1 Tab by mouth every day.   Dose:  60 mg                          CHANGE how you take these medications        Instructions    Morning Afternoon Evening Bedtime    levetiracetam 750 MG tablet   What changed:    - medication strength  - how much to take  - when to take this   Last time this was given:  500 mg on 7/29/2017 10:02 AM   Commonly known as:  KEPPRA        Take 1 Tab by mouth 2 Times a Day.   Dose:  750 mg                          CONTINUE taking these medications        Instructions    Morning Afternoon Evening Bedtime    amlodipine 10 MG Tabs   Last time this was given:  10 mg on 7/31/2017  8:48 AM   Commonly known as:  NORVASC        Take 10 mg by mouth every day.   Dose:  10 mg                        CALCIUM 1200 PO        Take 1 Tab by mouth every day.   Dose:   1 Tab                        cyanocobalamin 100 MCG Tabs   Commonly known as:  VITAMIN B-12        Take 100 mcg by mouth every day.   Dose:  100 mcg                        DILANTIN 100 MG Caps   Last time this was given:  600 mg on 7/30/2017  8:02 PM   Generic drug:  phenytoin ER        Take 600 mg by mouth every evening.   Dose:  600 mg                        LIPITOR 80 MG tablet   Last time this was given:  80 mg on 7/30/2017  8:03 PM   Generic drug:  atorvastatin        Take 80 mg by mouth every day.   Dose:  80 mg                        losartan 100 MG Tabs   Last time this was given:  100 mg on 7/31/2017  8:48 AM   Commonly known as:  COZAAR        Take 100 mg by mouth every day.   Dose:  100 mg                        memantine 10 MG Tabs   Last time this was given:  10 mg on 7/31/2017  8:47 AM   Commonly known as:  NAMENDA        Take 10 mg by mouth 2 times a day.   Dose:  10 mg                        mirtazapine 15 MG Tabs   Last time this was given:  30 mg on 7/30/2017  8:04 PM   Commonly known as:  REMERON        Take 30 mg by mouth every bedtime.   Dose:  30 mg                        omeprazole 20 MG delayed-release capsule   Last time this was given:  20 mg on 7/31/2017  8:48 AM   Commonly known as:  PRILOSEC        Take 20 mg by mouth every day.   Dose:  20 mg                          STOP taking these medications     aspirin EC 81 MG Tbec   Commonly known as:  ECOTRIN               metoprolol 25 MG Tabs   Commonly known as:  LOPRESSOR                    Where to Get Your Medications      Information about where to get these medications is not yet available     ! Ask your nurse or doctor about these medications    - carvedilol 25 MG Tabs  - isosorbide mononitrate SR 60 MG Tb24  - levetiracetam 750 MG tablet            Orders for after discharge     REFERRAL TO HOME HEALTH    Complete by:  As directed    Home health will create and establish a plan of care             Instructions           Diet /  Nutrition:    Follow any diet instructions given to you by your doctor or the dietician, including how much salt (sodium) you are allowed each day.    If you are overweight, talk to your doctor about a weight reduction plan.    Activity:    Remain physically active following your doctor's instructions about exercise and activity.    Rest often.     Any time you become even a little tired or short of breath, SIT DOWN and rest.    Worsening Symptoms:    Report any of the following signs and symptoms to the doctor's office immediately:    *Pain of jaw, arm, or neck  *Chest pain not relieved by medication                               *Dizziness or loss of consciousness  *Difficulty breathing even when at rest   *More tired than usual                                       *Bleeding drainage or swelling of surgical site  *Swelling of feet, ankles, legs or stomach                 *Fever (>100ºF)  *Pink or blood tinged sputum  *Weight gain (3lbs/day or 5lbs /week)           *Shock from internal defibrillator (if applicable)  *Palpitations or irregular heartbeats                *Cool and/or numb extremities    Stroke Awareness    Common Risk Factors for Stroke include:    Age  Atrial Fibrillation  Carotid Artery Stenosis  Diabetes Mellitus  Excessive alcohol consumption  High blood pressure  Overweight   Physical inactivity  Smoking    Warning signs and symptoms of a stroke include:    *Sudden numbness or weakness of the face, arm or leg (especially on one side of the body).  *Sudden confusion, trouble speaking or understanding.  *Sudden trouble seeing in one or both eyes.  *Sudden trouble walking, dizziness, loss of balance or coordination.Sudden severe headache with no known cause.    It is very important to get treatment quickly when a stroke occurs. If you experience any of the above warning signs, call 911 immediately.                   Disclaimer         Quit Smoking / Tobacco Use:    I understand the use of any  tobacco products increases my chance of suffering from future heart disease or stroke and could cause other illnesses which may shorten my life. Quitting the use of tobacco products is the single most important thing I can do to improve my health. For further information on smoking / tobacco cessation call a Toll Free Quit Line at 1-908.756.7538 (*[employer] [place]) or 1-208.152.3003 (American Lung Association) or you can access the web based program at www.lungusa.org.    [place] Tobacco Users Help Line:  (229) 496-2178       Toll Free: 1-691.319.5096  Quit Tobacco Program [facility] (937)337-4509    Crisis Hotline:    [place] Crisis Hotline:  9-143-AKILPDZ or 1-929.947.6851    [place] Crisis Hotline:    1-951.250.7290 or 027-333-2267    Discharge Survey:   Thank you for choosing [facility]. We hope we did everything we could to make your hospital stay a pleasant one. You may be receiving a phone survey and we would appreciate your time and participation in answering the questions. Your input is very valuable to us in our efforts to improve our service to our patients and their families.        My signature on this form indicates that:    1. I have reviewed and understand the above information.  2. My questions regarding this information have been answered to my satisfaction.  3. I have formulated a plan with my discharge nurse to obtain my prescribed medications for home.                  Disclaimer         __________________________________                     __________       ________                       Patient Signature                                                 Date                    Time

## 2017-07-24 NOTE — IP AVS SNAPSHOT
" <p align=\"LEFT\"><IMG SRC=\"[email or web address]\" alt=\"Image\" WIDTH=\"50%\" HEIGHT=\"200\" BORDER=\"\"></p>                   Name:Alex Bond  Medical Record Number:[id number]  CSN: [id number]    YOB: 1940   Age: 77 y.o.  Sex: male  HT:1.829 m (6' 0.01\") WT: 86.2 kg (190 lb 0.6 oz)          Admit Date: 7/24/2017     Discharge Date:   Today's Date: 7/31/2017  Attending Doctor:  Tan Armstrong M.D.                  Allergies:  Other misc             Medication List      Take these Medications        Instructions    amlodipine 10 MG Tabs   Commonly known as:  NORVASC    Take 10 mg by mouth every day.   Dose:  10 mg       CALCIUM 1200 PO    Take 1 Tab by mouth every day.   Dose:  1 Tab       carvedilol 25 MG Tabs   Commonly known as:  COREG    Take 1 Tab by mouth 2 times a day, with meals.   Dose:  25 mg       cyanocobalamin 100 MCG Tabs   Commonly known as:  VITAMIN B-12    Take 100 mcg by mouth every day.   Dose:  100 mcg       DILANTIN 100 MG Caps   Generic drug:  phenytoin ER    Take 600 mg by mouth every evening.   Dose:  600 mg       isosorbide mononitrate SR 60 MG Tb24   Commonly known as:  IMDUR    Take 1 Tab by mouth every day.   Dose:  60 mg       levetiracetam 750 MG tablet   What changed:    - medication strength  - how much to take  - when to take this   Commonly known as:  KEPPRA    Take 1 Tab by mouth 2 Times a Day.   Dose:  750 mg       LIPITOR 80 MG tablet   Generic drug:  atorvastatin    Take 80 mg by mouth every day.   Dose:  80 mg       losartan 100 MG Tabs   Commonly known as:  COZAAR    Take 100 mg by mouth every day.   Dose:  100 mg       memantine 10 MG Tabs   Commonly known as:  NAMENDA    Take 10 mg by mouth 2 times a day.   Dose:  10 mg       mirtazapine 15 MG Tabs   Commonly known as:  REMERON    Take 30 mg by mouth every bedtime.   Dose:  30 mg       omeprazole 20 MG delayed-release capsule   Commonly known as:  PRILOSEC    Take 20 mg by mouth every day.   Dose:  20 mg         "

## 2017-07-24 NOTE — ASSESSMENT & PLAN NOTE
Continue with permissive hypertension however control if the blood pressure goes above 160 and do not let her go below 100.

## 2017-07-24 NOTE — ED NOTES
"Pt arrived via EMS, transfer from [facility]. Per EMS pt had multiple seizures today, hx of seizures. Was evaluated and CT shows epidural hematoma. On arrival pt yelling \"ouch\". When asked where his pain is pt states \"everywhere\" than states \"my neck.\" MD Moreno at bedside. Placed pt in c-collar.   "

## 2017-07-25 ENCOUNTER — APPOINTMENT (OUTPATIENT)
Dept: RADIOLOGY | Facility: MEDICAL CENTER | Age: 77
DRG: 064 | End: 2017-07-25
Attending: NEUROLOGICAL SURGERY
Payer: MEDICARE

## 2017-07-25 ENCOUNTER — APPOINTMENT (OUTPATIENT)
Dept: RADIOLOGY | Facility: MEDICAL CENTER | Age: 77
DRG: 064 | End: 2017-07-25
Attending: HOSPITALIST
Payer: MEDICARE

## 2017-07-25 ENCOUNTER — APPOINTMENT (OUTPATIENT)
Dept: RADIOLOGY | Facility: MEDICAL CENTER | Age: 77
DRG: 064 | End: 2017-07-25
Attending: PHYSICIAN ASSISTANT
Payer: MEDICARE

## 2017-07-25 PROBLEM — I16.1 HYPERTENSIVE EMERGENCY: Status: ACTIVE | Noted: 2017-07-24

## 2017-07-25 LAB
ANION GAP SERPL CALC-SCNC: 7 MMOL/L (ref 0–11.9)
BUN SERPL-MCNC: 20 MG/DL (ref 8–22)
CALCIUM SERPL-MCNC: 8.3 MG/DL (ref 8.5–10.5)
CHLORIDE SERPL-SCNC: 111 MMOL/L (ref 96–112)
CO2 SERPL-SCNC: 24 MMOL/L (ref 20–33)
CREAT SERPL-MCNC: 0.93 MG/DL (ref 0.5–1.4)
ERYTHROCYTE [DISTWIDTH] IN BLOOD BY AUTOMATED COUNT: 47.9 FL (ref 35.9–50)
GFR SERPL CREATININE-BSD FRML MDRD: >60 ML/MIN/1.73 M 2
GLUCOSE SERPL-MCNC: 116 MG/DL (ref 65–99)
HCT VFR BLD AUTO: 34.8 % (ref 42–52)
HGB BLD-MCNC: 11.7 G/DL (ref 14–18)
MCH RBC QN AUTO: 33.5 PG (ref 27–33)
MCHC RBC AUTO-ENTMCNC: 33.6 G/DL (ref 33.7–35.3)
MCV RBC AUTO: 99.7 FL (ref 81.4–97.8)
PLATELET # BLD AUTO: 183 K/UL (ref 164–446)
PMV BLD AUTO: 10.5 FL (ref 9–12.9)
POTASSIUM SERPL-SCNC: 3.5 MMOL/L (ref 3.6–5.5)
RBC # BLD AUTO: 3.49 M/UL (ref 4.7–6.1)
SODIUM SERPL-SCNC: 142 MMOL/L (ref 135–145)
WBC # BLD AUTO: 11.2 K/UL (ref 4.8–10.8)

## 2017-07-25 PROCEDURE — 70450 CT HEAD/BRAIN W/O DYE: CPT

## 2017-07-25 PROCEDURE — 700101 HCHG RX REV CODE 250: Performed by: HOSPITALIST

## 2017-07-25 PROCEDURE — A9270 NON-COVERED ITEM OR SERVICE: HCPCS | Performed by: INTERNAL MEDICINE

## 2017-07-25 PROCEDURE — A9270 NON-COVERED ITEM OR SERVICE: HCPCS | Performed by: HOSPITALIST

## 2017-07-25 PROCEDURE — 700111 HCHG RX REV CODE 636 W/ 250 OVERRIDE (IP): Performed by: HOSPITALIST

## 2017-07-25 PROCEDURE — 99291 CRITICAL CARE FIRST HOUR: CPT | Performed by: INTERNAL MEDICINE

## 2017-07-25 PROCEDURE — 700102 HCHG RX REV CODE 250 W/ 637 OVERRIDE(OP): Performed by: INTERNAL MEDICINE

## 2017-07-25 PROCEDURE — 80048 BASIC METABOLIC PNL TOTAL CA: CPT

## 2017-07-25 PROCEDURE — 770022 HCHG ROOM/CARE - ICU (200)

## 2017-07-25 PROCEDURE — 700105 HCHG RX REV CODE 258: Performed by: HOSPITALIST

## 2017-07-25 PROCEDURE — 700102 HCHG RX REV CODE 250 W/ 637 OVERRIDE(OP): Performed by: HOSPITALIST

## 2017-07-25 PROCEDURE — 85027 COMPLETE CBC AUTOMATED: CPT

## 2017-07-25 RX ORDER — PHENYTOIN SODIUM 100 MG/1
300 CAPSULE, EXTENDED RELEASE ORAL NIGHTLY
Status: COMPLETED | OUTPATIENT
Start: 2017-07-25 | End: 2017-07-25

## 2017-07-25 RX ORDER — PHENYTOIN SODIUM 100 MG/1
600 CAPSULE, EXTENDED RELEASE ORAL NIGHTLY
Status: DISCONTINUED | OUTPATIENT
Start: 2017-07-26 | End: 2017-07-31 | Stop reason: HOSPADM

## 2017-07-25 RX ORDER — PHENYTOIN SODIUM 50 MG/ML
300 INJECTION INTRAMUSCULAR; INTRAVENOUS EVERY 12 HOURS
Status: DISCONTINUED | OUTPATIENT
Start: 2017-07-25 | End: 2017-07-25

## 2017-07-25 RX ORDER — AMLODIPINE BESYLATE 5 MG/1
10 TABLET ORAL
Status: DISCONTINUED | OUTPATIENT
Start: 2017-07-25 | End: 2017-07-31 | Stop reason: HOSPADM

## 2017-07-25 RX ORDER — LEVETIRACETAM 500 MG/1
500 TABLET ORAL 2 TIMES DAILY
Status: DISCONTINUED | OUTPATIENT
Start: 2017-07-25 | End: 2017-07-29

## 2017-07-25 RX ORDER — LOSARTAN POTASSIUM 50 MG/1
100 TABLET ORAL
Status: DISCONTINUED | OUTPATIENT
Start: 2017-07-25 | End: 2017-07-31 | Stop reason: HOSPADM

## 2017-07-25 RX ADMIN — DEXTROSE MONOHYDRATE 500 MG: 50 INJECTION, SOLUTION INTRAVENOUS at 09:08

## 2017-07-25 RX ADMIN — PHENYTOIN SODIUM 300 MG: 50 INJECTION INTRAMUSCULAR; INTRAVENOUS at 09:08

## 2017-07-25 RX ADMIN — ATORVASTATIN CALCIUM 80 MG: 80 TABLET, FILM COATED ORAL at 20:18

## 2017-07-25 RX ADMIN — LOSARTAN POTASSIUM 100 MG: 50 TABLET, FILM COATED ORAL at 09:03

## 2017-07-25 RX ADMIN — STANDARDIZED SENNA CONCENTRATE AND DOCUSATE SODIUM 2 TABLET: 8.6; 5 TABLET, FILM COATED ORAL at 09:02

## 2017-07-25 RX ADMIN — SODIUM CHLORIDE 10 MG/HR: 9 INJECTION, SOLUTION INTRAVENOUS at 21:08

## 2017-07-25 RX ADMIN — SODIUM CHLORIDE 12.5 MG/HR: 9 INJECTION, SOLUTION INTRAVENOUS at 03:15

## 2017-07-25 RX ADMIN — SODIUM CHLORIDE 10 MG/HR: 9 INJECTION, SOLUTION INTRAVENOUS at 18:00

## 2017-07-25 RX ADMIN — LEVETIRACETAM 500 MG: 500 TABLET, FILM COATED ORAL at 20:18

## 2017-07-25 RX ADMIN — MEMANTINE HYDROCHLORIDE 10 MG: 10 TABLET, FILM COATED ORAL at 09:03

## 2017-07-25 RX ADMIN — MEMANTINE HYDROCHLORIDE 10 MG: 10 TABLET, FILM COATED ORAL at 20:18

## 2017-07-25 RX ADMIN — METOPROLOL TARTRATE 25 MG: 25 TABLET, FILM COATED ORAL at 20:17

## 2017-07-25 RX ADMIN — MIRTAZAPINE 30 MG: 15 TABLET, FILM COATED ORAL at 20:18

## 2017-07-25 RX ADMIN — METOPROLOL TARTRATE 25 MG: 25 TABLET, FILM COATED ORAL at 09:02

## 2017-07-25 RX ADMIN — OMEPRAZOLE 20 MG: 20 CAPSULE, DELAYED RELEASE ORAL at 09:02

## 2017-07-25 RX ADMIN — PHENYTOIN SODIUM 300 MG: 100 CAPSULE ORAL at 20:18

## 2017-07-25 RX ADMIN — AMLODIPINE BESYLATE 10 MG: 10 TABLET ORAL at 18:00

## 2017-07-25 RX ADMIN — SODIUM CHLORIDE 10 MG/HR: 9 INJECTION, SOLUTION INTRAVENOUS at 09:07

## 2017-07-25 ASSESSMENT — PAIN SCALES - GENERAL
PAINLEVEL_OUTOF10: 0
PAINLEVEL_OUTOF10: 1
PAINLEVEL_OUTOF10: 5
PAINLEVEL_OUTOF10: 0

## 2017-07-25 NOTE — CARE PLAN
Problem: Pain Management  Goal: Pain level will decrease to patient’s comfort goal  Outcome: PROGRESSING AS EXPECTED    Problem: Urinary Elimination:  Goal: Ability to reestablish a normal urinary elimination pattern will improve  Outcome: PROGRESSING AS EXPECTED

## 2017-07-25 NOTE — PROGRESS NOTES
Bedside swallow performed by this RN. Pt tolerated thin liquid well without coughing or signs of aspiration. Diet will be ordered

## 2017-07-25 NOTE — H&P
Physician H&P    Patient ID:  Alex Bond  [id number]  77 y.o. male  1940    History:  Primary Diagnosis: small left temporal sdh.    HPI:  Fell out of bed.    Past Medical History:  has a past medical history of Seizure disorder (CMS-HCC); Hypertension; High cholesterol; and Alzheimer disease.  Past Surgical History:  has no past surgical history on file.  Past Social History:  reports that he has never smoked. He does not have any smokeless tobacco history on file. He reports that he does not drink alcohol or use illicit drugs.  Past Family History: History reviewed. No pertinent family history.  Allergies: Other misc    Current Medications:  Prior to Admission medications    Medication Sig Start Date End Date Taking? Authorizing Provider   levetiracetam (KEPPRA) 500 MG Tab Take 500 mg by mouth 2 times a day.   Yes Not In System Provider   memantine (NAMENDA) 10 MG Tab Take 10 mg by mouth 2 times a day.   Yes Not In System Provider   metoprolol (LOPRESSOR) 25 MG Tab Take 25 mg by mouth 2 times a day.   Yes Not In System Provider   omeprazole (PRILOSEC) 20 MG delayed-release capsule Take 20 mg by mouth every day.   Yes Not In System Provider   atorvastatin (LIPITOR) 80 MG tablet Take 80 mg by mouth every day.   Yes Not In System Provider   amlodipine (NORVASC) 10 MG Tab Take 10 mg by mouth every day.   Yes Not In System Provider   Calcium Carbonate-Vit D-Min (CALCIUM 1200 PO) Take 1 Tab by mouth every day.   Yes Not In System Provider   cyanocobalamin (VITAMIN B-12) 100 MCG Tab Take 100 mcg by mouth every day.   Yes Not In System Provider   aspirin EC (ECOTRIN) 81 MG Tablet Delayed Response Take 81 mg by mouth every day.   Yes Not In System Provider   mirtazapine (REMERON) 15 MG Tab Take 30 mg by mouth every bedtime.   Yes Not In System Provider   losartan (COZAAR) 100 MG Tab Take 100 mg by mouth every day.   Yes Not In System Provider   phenytoin ER (DILANTIN) 100 MG Cap Take 600 mg by mouth every evening.    "Yes Not In System Provider       Review of Systems:  Review of Systems   Unable to perform ROS: acuity of condition     Blood pressure 149/110, pulse 102, temperature 36.5 °C (97.7 °F), resp. rate 16, height 1.829 m (6' 0.01\"), weight 90.8 kg (200 lb 2.8 oz), SpO2 97 %.    Physical Examination:  Physical Exam   Neurological:   Patient is alert but confused, oriented x 1, patient is located at his house currently per patient, follows commands x 4 extremities with good strength, patient appears younger than stated age.       Impression:    Nonoperative left temporal sdh/edh.      Plan:  Recommend q 2 hour neuro checks and repeat Head CT in am.    Pre Procedure Assessment:  <EXAMSHORT2>      Pre Procedure update:   No changes.    Mayco Marti  7/24/2017  "

## 2017-07-25 NOTE — PROGRESS NOTES
[facility]    Date of Service: 2017    Chief Complaint  77 y.o. male admitted 2017 with ALOC.    Interval Problem Update  Pt with hx of dementia, noted to be altered.  Was in town on vacation.  Noted ICH.  Pt seen in ICU, ICU care given.  Discussed patient condition and plan with RN, RT and charge nurse / hot rounds.      Consultants/Specialty  neurosx - Dr Marti    Disposition  Patient is critically ill.   The patient continues to have : hypertensive emergency  The vital organ system that is effected is the: neuro initially   If untreated there is a high chance of deterioration into: permanent brain injury or death   The critical care that I am providing today is: nicardipine gtt  The critical care that has been undertaken is medically complex.   There has been no overlap in critical care time.  Critical care time not including procedures, no overlap: 37 minutes        Review of Systems   Unable to perform ROS: acuity of condition      Physical Exam  Laboratory/Imaging   Hemodynamics  Temp (24hrs), Av.3 °C (99.1 °F), Min:36.5 °C (97.7 °F), Max:37.7 °C (99.8 °F)   Temperature: 37.2 °C (98.9 °F)  Pulse  Av.6  Min: 60  Max: 109 Heart Rate (Monitored): 73  Blood Pressure : 149/110 mmHg, NIBP: (!) 175/65 mmHg      Respiratory      Respiration: 16, Pulse Oximetry: 95 %, O2 Daily Delivery Respiratory : Silicone Nasal Cannula     Work Of Breathing / Effort: Mild  RLL Breath Sounds: Diminished, LLL Breath Sounds: Diminished    Fluids    Intake/Output Summary (Last 24 hours) at 17 0848  Last data filed at 17 0600   Gross per 24 hour   Intake 3000.68 ml   Output    850 ml   Net 2150.68 ml       Nutrition  Orders Placed This Encounter   Procedures   • Diet NPO     Standing Status: Standing      Number of Occurrences: 1      Standing Expiration Date:      Order Specific Question:  Restrict to:     Answer:  Strict [1]     Physical Exam   Constitutional: He appears  well-developed.  Non-toxic appearance. No distress.   HENT:   Head: Normocephalic and atraumatic.   Mouth/Throat: Oropharynx is clear and moist. No oropharyngeal exudate.   Eyes: Right eye exhibits no discharge. Left eye exhibits no discharge.   Neck: Neck supple. No tracheal deviation, no edema and no erythema present.   Cardiovascular: Normal rate and regular rhythm.  Exam reveals no gallop and no friction rub.    No murmur heard.  Pulmonary/Chest: Effort normal and breath sounds normal. No stridor. No respiratory distress. He has no wheezes. He has no rales.   Abdominal: Soft. Bowel sounds are normal. He exhibits no distension.   Musculoskeletal: He exhibits no edema.   Lymphadenopathy:     He has no cervical adenopathy.   Neurological:   Somnolent but arousable, confused    Skin: Skin is warm and dry. No rash noted. He is not diaphoretic. No erythema.   Psychiatric: He is slowed. Cognition and memory are impaired.   Nursing note and vitals reviewed.      Recent Labs      07/24/17   1312  07/25/17   0615   WBC  12.6*  11.2*   RBC  4.28*  3.49*   HEMOGLOBIN  14.6  11.7*   HEMATOCRIT  43.7  34.8*   MCV  102.1*  99.7*   MCH  34.1*  33.5*   MCHC  33.4*  33.6*   RDW  49.8  47.9   PLATELETCT  196  183   MPV  10.0  10.5     Recent Labs      07/24/17   1312  07/25/17   0615   SODIUM  137  142   POTASSIUM  3.8  3.5*   CHLORIDE  106  111   CO2  21  24   GLUCOSE  167*  116*   BUN  18  20   CREATININE  1.00  0.93   CALCIUM  8.8  8.3*     Recent Labs      07/24/17   1312   APTT  24.0*   INR  1.05                  Assessment/Plan     * Epidural hematoma (CMS-HCC) (present on admission)  Assessment & Plan  - continue q1 hour neuro checks  - unlikely to require surgery  - additional recommendations per neurosx    Hypertensive emergency (present on admission)  Assessment & Plan  - continue nicardipine gtt  - will add home metoprolol and cozaar  - adjust as needed  - keep sbp < 160 in pt with ICH    Seizure disorder (CMS-HCC)  (present on admission)  Assessment & Plan  - continue keppra and dilantin iv  - monitor closely      Leukocytosis (present on admission)  Assessment & Plan  - improved, reactive       Lactic acidosis (present on admission)  Assessment & Plan  - resolved, not infectious     Dyslipidemia (present on admission)  Assessment & Plan  - continue statin    Alzheimer's dementia (present on admission)  Assessment & Plan  - acutely worse d/t ICH    Hyperglycemia (present on admission)  Assessment & Plan  - mild, no need for coverage    Macrocytosis without anemia (present on admission)  Assessment & Plan  - no gross bleeding     Hematuria, microscopic (present on admission)  Assessment & Plan  - resolved      Labs reviewed, Medications reviewed and Radiology images reviewed        DVT Prophylaxis: Contraindicated - High bleeding risk  DVT prophylaxis - mechanical: SCDs  Ulcer prophylaxis: Yes

## 2017-07-25 NOTE — CARE PLAN
Problem: Safety  Goal: Will remain free from injury  Outcome: PROGRESSING AS EXPECTED  Restraints in use for pt safety. Bed alarm on, bed in low position. Frequent rounding in place.     Problem: Knowledge Deficit  Goal: Knowledge of disease process/condition, treatment plan, diagnostic tests, and medications will improve  Outcome: PROGRESSING SLOWER THAN EXPECTED  Updating pt on plan of care and unit routine.

## 2017-07-25 NOTE — THERAPY
Physical Therapy Contact Note    PT consult received, awaiting repeat imaging; will follow up post as medical plan established;    Marce Coles, PT, DPT Pager: [phone]

## 2017-07-25 NOTE — PROCEDURES
"Attempted MRI brain without contrast. Exam explained in detail to pt multiple times. Pt continued to yell \"you haven't explained anything to me\".  Every attempt made to make pt as comfortable as possible. Pt made multiple complaints and was verbally aggressive to staff throughout attempt at exam. Pt continued to verbalize desire to complete exam. Tech asked pt multiple times if pt would like to refuse exam. Pt stated he \"wanted to try\". Approximately 2 minutes into exam pt alerted staff with provided squeeze alarm that he did not want to continue. Pt immediately placed back on MRI [facility] and returned to nursing unit. RN will contact MD.   "

## 2017-07-25 NOTE — PROGRESS NOTES
"Trauma / Surgical Progress Note  Interval Events:  24 hour interval history reviewed and clinically improving, more alert      Review of Systems   Unable to perform ROS: medical condition       Vitals:  Blood pressure 149/110, pulse 88, temperature 37.2 °C (98.9 °F), resp. rate 16, height 1.829 m (6' 0.01\"), weight 87.4 kg (192 lb 10.9 oz), SpO2 92 %.  Temp (24hrs), Av.3 °C (99.1 °F), Min:36.5 °C (97.7 °F), Max:37.7 °C (99.8 °F)      IO:    Date 17 0700 - 17 0659   Shift 4509-5489 4032-3494 1376-9081 24 Hour Total   I  N  T  A  K  E   I.V. 225.8   225.8      Cardene Volume 225.8   225.8    Shift Total 225.8   225.8   O  U  T  P  U  T   Urine 60   60      Ureteral Catheter 60   60    Shift Total 60   60   .8   165.8       Exam:  Respiratory: unlabored respirations, no intercostal retractions or accessory muscle use  Neuro: no focal deficits noted, Confused  Cardiovascular: regular rate and rhythm  GI: abdomen is soft and non-tender      Labs:  Recent Results (from the past 24 hour(s))   BLOOD CULTURE x2    Collection Time: 17  2:42 PM   Result Value Ref Range    Significant Indicator NEG     Source BLD     Site PERIPHERAL     Blood Culture       No Growth    Note: Blood cultures are incubated for 5 days and  are monitored continuously.Positive blood cultures  are called to the RN and reported as soon as  they are identified.     BLOOD CULTURE x2    Collection Time: 17  2:42 PM   Result Value Ref Range    Significant Indicator NEG     Source BLD     Site PERIPHERAL     Blood Culture       No Growth    Note: Blood cultures are incubated for 5 days and  are monitored continuously.Positive blood cultures  are called to the RN and reported as soon as  they are identified.     EKG (ER)    Collection Time: 17  3:15 PM   Result Value Ref Range    Report       [facility] Emergency Dept[facility]    Test Date:  2017  Pt Name:    LYNNE PACE              Department: " ER  MRN:        [id number]                      Room:        13  Gender:     M                            Technician: [id number]  :        [id number]                   Requested By:ER TRIAGE PROTOCOL  Order #:    [id number]                    Reading MD:    Measurements  Intervals                                Axis  Rate:       84                           P:          0  NE:         155                          QRS:        -72  QRSD:       136                          T:          16  QT:         444  QTc:        525    Interpretive Statements  SINUS RHYTHM  RIGHT BUNDLE BRANCH BLOCK  No previous ECG available for comparison     URINALYSIS CULTURE, IF INDICATED    Collection Time: 17  3:34 PM   Result Value Ref Range    Color Yellow     Character Clear     Specific Gravity 1.017 <1.035    Ph 5.5 5.0-8.0    Glucose 100 (A) Negative mg/dL    Ketones Negative Negative mg/dL    Protein 30 (A) Negative mg/dL    Bilirubin Negative Negative    Urobilinogen, Urine 0.2 Negative    Nitrite Negative Negative    Leukocyte Esterase Negative Negative    Occult Blood Trace (A) Negative    Micro Urine Req Microscopic     Culture Indicated No UA Culture   URINE MICROSCOPIC (W/UA)    Collection Time: 17  3:34 PM   Result Value Ref Range    WBC 0-2 (A) /hpf    RBC 0-2 (A) /hpf    Bacteria Negative None /hpf    Epithelial Cells Negative /hpf    Hyaline Cast 0-2 /lpf   CBC without Differential    Collection Time: 17  6:15 AM   Result Value Ref Range    WBC 11.2 (H) 4.8 - 10.8 K/uL    RBC 3.49 (L) 4.70 - 6.10 M/uL    Hemoglobin 11.7 (L) 14.0 - 18.0 g/dL    Hematocrit 34.8 (L) 42.0 - 52.0 %    MCV 99.7 (H) 81.4 - 97.8 fL    MCH 33.5 (H) 27.0 - 33.0 pg    MCHC 33.6 (L) 33.7 - 35.3 g/dL    RDW 47.9 35.9 - 50.0 fL    Platelet Count 183 164 - 446 K/uL    MPV 10.5 9.0 - 12.9 fL   Basic Metabolic Panel (BMP)    Collection Time: 17  6:15 AM   Result Value Ref Range    Sodium 142 135 - 145 mmol/L    Potassium 3.5 (L) 3.6 - 5.5  mmol/L    Chloride 111 96 - 112 mmol/L    Co2 24 20 - 33 mmol/L    Glucose 116 (H) 65 - 99 mg/dL    Bun 20 8 - 22 mg/dL    Creatinine 0.93 0.50 - 1.40 mg/dL    Calcium 8.3 (L) 8.5 - 10.5 mg/dL    Anion Gap 7.0 0.0 - 11.9   ESTIMATED GFR    Collection Time: 07/25/17  6:15 AM   Result Value Ref Range    GFR If African American >60 >60 mL/min/1.73 m 2    GFR If Non African American >60 >60 mL/min/1.73 m 2     A-seizures, fall, and stable small subdural  No evidence of problems that would benefit from trauma service input  Will sign off-discussed with Dr. Giraldo-lewis Carr [phone]  Problem and Plan:  Active Hospital Problems    Diagnosis   • Epidural hematoma (CMS-HCC) [S06.4X9A]     Priority: High   • Seizure disorder (CMS-[facility]) [G40.909]     Priority: Medium   • Hypertension [I10]     Priority: Medium   • Leukocytosis [D72.829]     Priority: Medium   • Lactic acidosis [E87.2]     Priority: Medium   • Dyslipidemia [E78.5]     Priority: Low   • Alzheimer's dementia [G30.9]     Priority: Low   • Hyperglycemia [R73.9]     Priority: Low   • Macrocytosis without anemia [D75.89]     Priority: Low   • Hematuria, microscopic [R31.29]     Priority: Low   • Proteinuria [R80.9]     Priority: Low       Core Measures & Quality Metrics:  Core Measures & Quality Metrics

## 2017-07-26 PROCEDURE — 700102 HCHG RX REV CODE 250 W/ 637 OVERRIDE(OP): Performed by: INTERNAL MEDICINE

## 2017-07-26 PROCEDURE — A9270 NON-COVERED ITEM OR SERVICE: HCPCS | Performed by: INTERNAL MEDICINE

## 2017-07-26 PROCEDURE — 700101 HCHG RX REV CODE 250: Performed by: HOSPITALIST

## 2017-07-26 PROCEDURE — A9270 NON-COVERED ITEM OR SERVICE: HCPCS | Performed by: HOSPITALIST

## 2017-07-26 PROCEDURE — 700102 HCHG RX REV CODE 250 W/ 637 OVERRIDE(OP): Performed by: HOSPITALIST

## 2017-07-26 PROCEDURE — 97166 OT EVAL MOD COMPLEX 45 MIN: CPT

## 2017-07-26 PROCEDURE — G8978 MOBILITY CURRENT STATUS: HCPCS | Mod: CK

## 2017-07-26 PROCEDURE — 97162 PT EVAL MOD COMPLEX 30 MIN: CPT

## 2017-07-26 PROCEDURE — 99291 CRITICAL CARE FIRST HOUR: CPT | Performed by: INTERNAL MEDICINE

## 2017-07-26 PROCEDURE — G8979 MOBILITY GOAL STATUS: HCPCS | Mod: CI

## 2017-07-26 PROCEDURE — G8987 SELF CARE CURRENT STATUS: HCPCS | Mod: CK

## 2017-07-26 PROCEDURE — G8988 SELF CARE GOAL STATUS: HCPCS | Mod: CI

## 2017-07-26 PROCEDURE — 770022 HCHG ROOM/CARE - ICU (200)

## 2017-07-26 PROCEDURE — 700105 HCHG RX REV CODE 258: Performed by: HOSPITALIST

## 2017-07-26 RX ORDER — ENALAPRILAT 1.25 MG/ML
2.5 INJECTION INTRAVENOUS EVERY 6 HOURS PRN
Status: DISCONTINUED | OUTPATIENT
Start: 2017-07-26 | End: 2017-07-31 | Stop reason: HOSPADM

## 2017-07-26 RX ORDER — CARVEDILOL 12.5 MG/1
12.5 TABLET ORAL 2 TIMES DAILY WITH MEALS
Status: DISCONTINUED | OUTPATIENT
Start: 2017-07-26 | End: 2017-07-27

## 2017-07-26 RX ADMIN — CARVEDILOL 12.5 MG: 12.5 TABLET, FILM COATED ORAL at 09:15

## 2017-07-26 RX ADMIN — STANDARDIZED SENNA CONCENTRATE AND DOCUSATE SODIUM 2 TABLET: 8.6; 5 TABLET, FILM COATED ORAL at 09:11

## 2017-07-26 RX ADMIN — STANDARDIZED SENNA CONCENTRATE AND DOCUSATE SODIUM 2 TABLET: 8.6; 5 TABLET, FILM COATED ORAL at 20:45

## 2017-07-26 RX ADMIN — SODIUM CHLORIDE 15 MG/HR: 9 INJECTION, SOLUTION INTRAVENOUS at 12:36

## 2017-07-26 RX ADMIN — PHENYTOIN SODIUM 600 MG: 100 CAPSULE, EXTENDED RELEASE ORAL at 20:46

## 2017-07-26 RX ADMIN — ATORVASTATIN CALCIUM 80 MG: 80 TABLET, FILM COATED ORAL at 20:46

## 2017-07-26 RX ADMIN — CARVEDILOL 12.5 MG: 12.5 TABLET, FILM COATED ORAL at 16:23

## 2017-07-26 RX ADMIN — SODIUM CHLORIDE 10 MG/HR: 9 INJECTION, SOLUTION INTRAVENOUS at 20:34

## 2017-07-26 RX ADMIN — SODIUM CHLORIDE 7.5 MG/HR: 9 INJECTION, SOLUTION INTRAVENOUS at 03:49

## 2017-07-26 RX ADMIN — OMEPRAZOLE 20 MG: 20 CAPSULE, DELAYED RELEASE ORAL at 09:11

## 2017-07-26 RX ADMIN — MEMANTINE HYDROCHLORIDE 10 MG: 10 TABLET, FILM COATED ORAL at 20:46

## 2017-07-26 RX ADMIN — LOSARTAN POTASSIUM 100 MG: 50 TABLET, FILM COATED ORAL at 09:15

## 2017-07-26 RX ADMIN — LEVETIRACETAM 500 MG: 500 TABLET, FILM COATED ORAL at 20:46

## 2017-07-26 RX ADMIN — MIRTAZAPINE 30 MG: 15 TABLET, FILM COATED ORAL at 20:45

## 2017-07-26 RX ADMIN — SODIUM CHLORIDE 15 MG/HR: 9 INJECTION, SOLUTION INTRAVENOUS at 08:10

## 2017-07-26 RX ADMIN — SODIUM CHLORIDE 15 MG/HR: 9 INJECTION, SOLUTION INTRAVENOUS at 16:00

## 2017-07-26 RX ADMIN — MEMANTINE HYDROCHLORIDE 10 MG: 10 TABLET, FILM COATED ORAL at 09:16

## 2017-07-26 RX ADMIN — AMLODIPINE BESYLATE 10 MG: 10 TABLET ORAL at 09:16

## 2017-07-26 RX ADMIN — LEVETIRACETAM 500 MG: 500 TABLET, FILM COATED ORAL at 09:15

## 2017-07-26 ASSESSMENT — ENCOUNTER SYMPTOMS
DIARRHEA: 0
COUGH: 0
WEAKNESS: 0
TINGLING: 0
HEADACHES: 0
ABDOMINAL PAIN: 0
STRIDOR: 0
FEVER: 0
CHILLS: 0
DIZZINESS: 0
PALPITATIONS: 0
MYALGIAS: 0
DEPRESSION: 0
CONSTIPATION: 0
SHORTNESS OF BREATH: 0
NAUSEA: 0
FALLS: 0
SPUTUM PRODUCTION: 0
VOMITING: 0
LOSS OF CONSCIOUSNESS: 0

## 2017-07-26 ASSESSMENT — COGNITIVE AND FUNCTIONAL STATUS - GENERAL
DRESSING REGULAR LOWER BODY CLOTHING: A LOT
DAILY ACTIVITIY SCORE: 13
MOVING FROM LYING ON BACK TO SITTING ON SIDE OF FLAT BED: A LITTLE
TOILETING: TOTAL
DRESSING REGULAR UPPER BODY CLOTHING: A LOT
HELP NEEDED FOR BATHING: A LOT
WALKING IN HOSPITAL ROOM: A LITTLE
EATING MEALS: A LITTLE
MOBILITY SCORE: 16
CLIMB 3 TO 5 STEPS WITH RAILING: A LITTLE
STANDING UP FROM CHAIR USING ARMS: A LITTLE
MOVING TO AND FROM BED TO CHAIR: A LOT
PERSONAL GROOMING: A LITTLE
SUGGESTED CMS G CODE MODIFIER DAILY ACTIVITY: CL
SUGGESTED CMS G CODE MODIFIER MOBILITY: CK
TURNING FROM BACK TO SIDE WHILE IN FLAT BAD: A LOT

## 2017-07-26 ASSESSMENT — PAIN SCALES - GENERAL
PAINLEVEL_OUTOF10: 0

## 2017-07-26 ASSESSMENT — GAIT ASSESSMENTS
DISTANCE (FEET): 150
DEVIATION: BRADYKINETIC;SHUFFLED GAIT;DECREASED BASE OF SUPPORT
ASSISTIVE DEVICE: FRONT WHEEL WALKER
GAIT LEVEL OF ASSIST: CONTACT GUARD ASSIST

## 2017-07-26 ASSESSMENT — ACTIVITIES OF DAILY LIVING (ADL): TOILETING: UNABLE TO DETERMINE AT THIS TIME

## 2017-07-26 ASSESSMENT — LIFESTYLE VARIABLES: DO YOU DRINK ALCOHOL: NO

## 2017-07-26 NOTE — CARE PLAN
Problem: Knowledge Deficit  Goal: Knowledge of disease process/condition, treatment plan, diagnostic tests, and medications will improve  Outcome: PROGRESSING AS EXPECTED    Problem: Discharge Barriers/Planning  Goal: Patient’s continuum of care needs will be met  Outcome: PROGRESSING SLOWER THAN EXPECTED    Problem: Urinary Elimination:  Goal: Ability to reestablish a normal urinary elimination pattern will improve  Outcome: PROGRESSING AS EXPECTED

## 2017-07-26 NOTE — PROGRESS NOTES
RN received a call from JIMENA Lawrence regarding reasoning for non completion of of MRI during day shift.  JIMENA updated and RN awaiting further instructions regarding Imaging.

## 2017-07-26 NOTE — PROGRESS NOTES
Pt escorted to and from CT with ACLS RN and CCT on monitor and 5L NC.  Minimal complaints from Pt regarding pain in neck while moving Pt onto CT table.  Otherwise Pt calm during CT.

## 2017-07-26 NOTE — THERAPY
"Occupational Therapy Evaluation completed.   Functional Status:  Pt s/p SDH, non-operative in nature, stable, demonstrating moderate deficits with ADLs, secondary to impaired balance, generalized weakness, decreased cognition( attention deficits), unclear cogntive baseline. Pt would benefit from acute and post acute skilled services prior to d/c home.   Plan of Care: Will benefit from Occupational Therapy 4 times per week  Discharge Recommendations:  Equipment: Will Continue to Assess for Equipment Needs. Post-acute therapy Discharge to a transitional care facility for continued skilled therapy services. and Discharge to home with outpatient or home health for additional skilled therapy services depending on baseline assist pt required, level of assist spouse able to provide.     See \"Rehab Therapy-Acute\" Patient Summary Report for complete documentation.    "

## 2017-07-26 NOTE — CARE PLAN
Problem: Infection  Goal: Will remain free from infection  Intervention: Assess signs and symptoms of infection  RN monitors VS and lab values to assess for S/S of infection.  RN washes hands before and after Pt care and adheres to standard precautions.      Problem: Pain Management  Goal: Pain level will decrease to patient’s comfort goal  Intervention: Follow pain managment plan developed in collaboration with patient and Interdisciplinary Team  RN works with Treatment Team to ensure the Pt pain is being adequately controlled.  RN uses appropriate pain scale to ensure pain is being gauged correctly.  Medication administered when Pt outside of parameters.

## 2017-07-26 NOTE — PROGRESS NOTES
Neurosurgery Progress Note  No events overnight    Exam:  Alert, confused, oriented to person, place, event - aware he injured his head, denies having bleed/stroke   NAD  Spontaneous breathing on room air with normal respiratory effort  Follows commands x4  PERRLA. EOMI.   R ptosis  Face otherwise symmetrical, tongue midline   CN II-XII grossly intact bilat  No difficulty with speech  Slight L pronator drift   Appropriate muscle tone and sensation intact all four extremities, 4+ left handgrip, dorsiflexion, plantarflexion         Temp  Av.3 °C (99.1 °F)  Min: 37 °C (98.6 °F)  Max: 37.7 °C (99.8 °F)  Pulse  Av.2  Min: 60  Max: 100  SpO2  Av.9 %  Min: 77 %  Max: 99 %    No Data Recorded    Recent Labs      17   1312  17   0615   WBC  12.6*  11.2*   RBC  4.28*  3.49*   HEMOGLOBIN  14.6  11.7*   HEMATOCRIT  43.7  34.8*   MCV  102.1*  99.7*   MCH  34.1*  33.5*   MCHC  33.4*  33.6*   RDW  49.8  47.9   PLATELETCT  196  183   MPV  10.0  10.5     Recent Labs      17   1312  17   0615   SODIUM  137  142   POTASSIUM  3.8  3.5*   CHLORIDE  106  111   CO2  21  24   GLUCOSE  167*  116*   BUN  18  20     Recent Labs      17   1312   APTT  24.0*   INR  1.05     Recent Labs      17   1312   REACTMIN  3.0*   CLOTKINET  1.7   CLOTANGL  64.4   MAXCLOTS  64.7   [id number]  0.0       Intake/Output       17 - 17 0659 17 - 17 0659       Total 7564-6727 7659-5718 Total       Intake    I.V.  247.6  2753.1 3000.7  225.8  -- 225.8    Cardene Volume 164.6 1557.1 1721.7 225.8 -- 225.8    IV Volume (NS) 83 996 1079 -- -- --    IV Piggyback Volume (IV Piggyback) -- 200 200 -- -- --    Total Intake 247.6 2753.1 3000.7 225.8 -- 225.8       Output    Urine  300  550 850  470  -- 470    Ureteral Catheter 300 550 850 470 -- 470    Total Output 300 550 850 470 -- 470       Net I/O     -52.4 2203.1 2150.7 -244.2 -- -244.2            Intake/Output Summary  (Last 24 hours) at 07/25/17 2041  Last data filed at 07/25/17 1800   Gross per 24 hour   Intake 2507.92 ml   Output    895 ml   Net 1612.92 ml            • losartan  100 mg Q DAY     • levetiracetam  500 mg BID     • [START ON 7/26/2017] phenytoin ER  600 mg Nightly     • amlodipine  10 mg Q DAY     • atorvastatin  80 mg QHS     • memantine  10 mg BID     • metoprolol  25 mg BID     • mirtazapine  30 mg QHS     • omeprazole  20 mg DAILY     • senna-docusate  2 Tab BID      And   • polyethylene glycol/lytes  1 Packet QDAY PRN      And   • magnesium hydroxide  30 mL QDAY PRN      And   • bisacodyl  10 mg QDAY PRN     • Respiratory Care per Protocol   Continuous RT     • acetaminophen  650 mg Q6HRS PRN     • hydrALAZINE  10-20 mg Q4HRS PRN     • enalaprilat  1.25 mg Q6HRS PRN     • ondansetron  4 mg Q4HRS PRN     • ondansetron  4 mg Q4HRS PRN     • lorazepam  0.5 mg Q6HRS PRN      Or   • lorazepam  0.5 mg Q6HRS PRN     • Nicardipine infusion  0-15 mg/hr Continuous 10 mg/hr (07/25/17 1842)       Assessment and Plan:  Hospital day #2  Scheduled for MRI today - needs repeat imaging prior to starting Ppx anticoag  Otherwise neuro stable without events  No neurosurgery intervention planned     Prophylactic anticoagulation: no         Start date/time:

## 2017-07-26 NOTE — PROGRESS NOTES
JIMENA Lawrence called to inform RN that an order is placed for CT scan.  RN verbalizes order back and will call CT to schedule time.

## 2017-07-26 NOTE — PROGRESS NOTES
Pt transport to CT and back with ACLS RN and CCT.  Pt had minor trouble with complaints of neck pain during transfer to CT table.  Otherwise Scan w/o incident.

## 2017-07-26 NOTE — PROGRESS NOTES
Clinical data, interval history, and CT head reviewed    No acute hemorrhage or other interval change in comparison with previous MRI  WBC downtrending, Na improved to 142    No neuro events reported overnight  Will sign off    Please reconsult PRN   Case discussed with Dr. Marti.

## 2017-07-26 NOTE — THERAPY
"Physical Therapy Evaluation completed.   Bed Mobility:  Supine to Sit: Moderate Assist  Transfers: Sit to Stand: Contact Guard Assist  Gait: Level Of Assist: Contact Guard Assist with Front-Wheel Walker       Plan of Care: Will benefit from Physical Therapy 3 times per week  Discharge Recommendations: Equipment: Will Continue to Assess for Equipment Needs.   Pt presents with impaired activity tolerance, safety awarness, and dynamic balance s/p L SDH in setting of baseline dementia. Pt is most limited by impulsivity and lack of internalization of education. need to confirm baseline mobility with family; if they provide 24/7 physical assist he may be near baseline and once medically stable can return home; however, if they do not will require SNF. Will follow.   See \"Rehab Therapy-Acute\" Patient Summary Report for complete documentation.     "

## 2017-07-26 NOTE — PROGRESS NOTES
[facility]    Date of Service: 2017    Chief Complaint  77 y.o. male admitted 2017 with ALOC.    Interval Problem Update  Pt with hx of dementia, noted to be altered.  Was in town on vacation.  Noted ICH.  Pt seen in ICU, ICU care given.  Discussed with wife on the phone.  Discussed patient condition and plan with RN, RT and charge nurse / hot rounds.      Consultants/Specialty  neurosx - Dr Marti    Disposition  Patient is critically ill.   The patient continues to have : hypertensive emergency  The vital organ system that is effected is the: neuro initially   If untreated there is a high chance of deterioration into: permanent brain injury or death   The critical care that I am providing today is: nicardipine gtt  The critical care that has been undertaken is medically complex.   There has been no overlap in critical care time.  Critical care time not including procedures, no overlap: 43 minutes        Review of Systems   Constitutional: Negative for fever, chills and malaise/fatigue.   HENT: Negative for congestion.    Respiratory: Negative for cough, sputum production, shortness of breath and stridor.    Cardiovascular: Negative for chest pain, palpitations and leg swelling.   Gastrointestinal: Negative for nausea, vomiting, abdominal pain, diarrhea and constipation.   Genitourinary: Negative for dysuria and urgency.   Musculoskeletal: Negative for myalgias and falls.   Neurological: Negative for dizziness, tingling, loss of consciousness, weakness and headaches.   Psychiatric/Behavioral: Negative for depression and suicidal ideas.      Physical Exam  Laboratory/Imaging   Hemodynamics  Temp (24hrs), Av.4 °C (99.4 °F), Min:37 °C (98.6 °F), Max:38 °C (100.4 °F)   Temperature: 37.2 °C (99 °F)  Pulse  Av.5  Min: 58  Max: 109 Heart Rate (Monitored): 90  NIBP: 159/66 mmHg      Respiratory      Pulse Oximetry: 92 %     Work Of Breathing / Effort: Mild  RUL Breath Sounds: Clear,  RML Breath Sounds: Clear, RLL Breath Sounds: Diminished, JOSE ENRIQUE Breath Sounds: Clear, LLL Breath Sounds: Diminished    Fluids    Intake/Output Summary (Last 24 hours) at 07/26/17 0832  Last data filed at 07/26/17 0600   Gross per 24 hour   Intake   2380 ml   Output    785 ml   Net   1595 ml       Nutrition  Orders Placed This Encounter   Procedures   • DIET ORDER     Standing Status: Standing      Number of Occurrences: 1      Standing Expiration Date:      Order Specific Question:  Diet:     Answer:  Cardiac [6]     Physical Exam   Constitutional:  Non-toxic appearance. No distress.   HENT:   Head: Normocephalic and atraumatic.   Mouth/Throat: No oropharyngeal exudate.   Eyes: Right eye exhibits no discharge. Left eye exhibits no discharge. No scleral icterus.   Neck: Neck supple. No edema and no erythema present.   Cardiovascular: Normal rate and regular rhythm.    No murmur heard.  Pulmonary/Chest: Effort normal and breath sounds normal. No stridor. No respiratory distress. He has no wheezes.   Abdominal: Soft. Bowel sounds are normal. He exhibits no distension. There is no tenderness.   Musculoskeletal: He exhibits no edema.   Lymphadenopathy:     He has no cervical adenopathy.   Neurological:   Awake, some confusion    Skin: Skin is warm and dry. He is not diaphoretic. No erythema.   Psychiatric: He is slowed. Cognition and memory are impaired.   Nursing note and vitals reviewed.      Recent Labs      07/24/17   1312  07/25/17   0615   WBC  12.6*  11.2*   RBC  4.28*  3.49*   HEMOGLOBIN  14.6  11.7*   HEMATOCRIT  43.7  34.8*   MCV  102.1*  99.7*   MCH  34.1*  33.5*   MCHC  33.4*  33.6*   RDW  49.8  47.9   PLATELETCT  196  183   MPV  10.0  10.5     Recent Labs      07/24/17   1312  07/25/17   0615   SODIUM  137  142   POTASSIUM  3.8  3.5*   CHLORIDE  106  111   CO2  21  24   GLUCOSE  167*  116*   BUN  18  20   CREATININE  1.00  0.93   CALCIUM  8.8  8.3*     Recent Labs      07/24/17   1312   APTT  24.0*   INR  1.05                   Assessment/Plan     * Epidural hematoma (CMS-HCC) (present on admission)  Assessment & Plan  - continue q2 hour neuro checks  - will not require surgery  - additional recommendations per neurosx    Hypertensive emergency (present on admission)  Assessment & Plan  - continue nicardipine gtt  - continue home cozaar, will change home metoprolol to coreg  - adjust as needed  - keep sbp < 160 in pt with ICH    Seizure disorder (CMS-HCC) (present on admission)  Assessment & Plan  - continue keppra and dilantin iv  - monitor closely      Leukocytosis (present on admission)  Assessment & Plan  - improved, reactive       Lactic acidosis (present on admission)  Assessment & Plan  - resolved, not infectious     Dyslipidemia (present on admission)  Assessment & Plan  - continue statin    Alzheimer's dementia (present on admission)  Assessment & Plan  - acutely worse d/t ICH    Hyperglycemia (present on admission)  Assessment & Plan  - mild, no need for coverage    Macrocytosis without anemia (present on admission)  Assessment & Plan  - no gross bleeding     Hematuria, microscopic (present on admission)  Assessment & Plan  - resolved      Labs reviewed, Medications reviewed and Radiology images reviewed        DVT Prophylaxis: Contraindicated - High bleeding risk  DVT prophylaxis - mechanical: SCDs  Ulcer prophylaxis: Yes

## 2017-07-27 ENCOUNTER — APPOINTMENT (OUTPATIENT)
Dept: RADIOLOGY | Facility: MEDICAL CENTER | Age: 77
DRG: 064 | End: 2017-07-27
Attending: HOSPITALIST
Payer: MEDICARE

## 2017-07-27 ENCOUNTER — APPOINTMENT (OUTPATIENT)
Dept: RADIOLOGY | Facility: MEDICAL CENTER | Age: 77
DRG: 064 | End: 2017-07-27
Attending: INTERNAL MEDICINE
Payer: MEDICARE

## 2017-07-27 LAB
ANION GAP SERPL CALC-SCNC: 8 MMOL/L (ref 0–11.9)
BUN SERPL-MCNC: 25 MG/DL (ref 8–22)
CALCIUM SERPL-MCNC: 8.3 MG/DL (ref 8.5–10.5)
CHLORIDE SERPL-SCNC: 113 MMOL/L (ref 96–112)
CO2 SERPL-SCNC: 20 MMOL/L (ref 20–33)
CREAT SERPL-MCNC: 1.01 MG/DL (ref 0.5–1.4)
ERYTHROCYTE [DISTWIDTH] IN BLOOD BY AUTOMATED COUNT: 46.5 FL (ref 35.9–50)
GFR SERPL CREATININE-BSD FRML MDRD: >60 ML/MIN/1.73 M 2
GLUCOSE SERPL-MCNC: 117 MG/DL (ref 65–99)
HCT VFR BLD AUTO: 32.5 % (ref 42–52)
HGB BLD-MCNC: 11.2 G/DL (ref 14–18)
MCH RBC QN AUTO: 34.1 PG (ref 27–33)
MCHC RBC AUTO-ENTMCNC: 34.5 G/DL (ref 33.7–35.3)
MCV RBC AUTO: 99.1 FL (ref 81.4–97.8)
PLATELET # BLD AUTO: 177 K/UL (ref 164–446)
PMV BLD AUTO: 9.9 FL (ref 9–12.9)
POTASSIUM SERPL-SCNC: 3.2 MMOL/L (ref 3.6–5.5)
RBC # BLD AUTO: 3.28 M/UL (ref 4.7–6.1)
SODIUM SERPL-SCNC: 141 MMOL/L (ref 135–145)
WBC # BLD AUTO: 10.2 K/UL (ref 4.8–10.8)

## 2017-07-27 PROCEDURE — 97535 SELF CARE MNGMENT TRAINING: CPT

## 2017-07-27 PROCEDURE — 700102 HCHG RX REV CODE 250 W/ 637 OVERRIDE(OP): Performed by: HOSPITALIST

## 2017-07-27 PROCEDURE — 700105 HCHG RX REV CODE 258: Performed by: HOSPITALIST

## 2017-07-27 PROCEDURE — 92610 EVALUATE SWALLOWING FUNCTION: CPT

## 2017-07-27 PROCEDURE — 99291 CRITICAL CARE FIRST HOUR: CPT | Performed by: INTERNAL MEDICINE

## 2017-07-27 PROCEDURE — 700101 HCHG RX REV CODE 250: Performed by: HOSPITALIST

## 2017-07-27 PROCEDURE — G8996 SWALLOW CURRENT STATUS: HCPCS | Mod: CL

## 2017-07-27 PROCEDURE — 700102 HCHG RX REV CODE 250 W/ 637 OVERRIDE(OP): Performed by: INTERNAL MEDICINE

## 2017-07-27 PROCEDURE — A9270 NON-COVERED ITEM OR SERVICE: HCPCS | Performed by: HOSPITALIST

## 2017-07-27 PROCEDURE — 770022 HCHG ROOM/CARE - ICU (200)

## 2017-07-27 PROCEDURE — G8997 SWALLOW GOAL STATUS: HCPCS | Mod: CH

## 2017-07-27 PROCEDURE — 700111 HCHG RX REV CODE 636 W/ 250 OVERRIDE (IP): Performed by: HOSPITALIST

## 2017-07-27 PROCEDURE — 80048 BASIC METABOLIC PNL TOTAL CA: CPT

## 2017-07-27 PROCEDURE — 85027 COMPLETE CBC AUTOMATED: CPT

## 2017-07-27 PROCEDURE — A9270 NON-COVERED ITEM OR SERVICE: HCPCS | Performed by: INTERNAL MEDICINE

## 2017-07-27 PROCEDURE — 700111 HCHG RX REV CODE 636 W/ 250 OVERRIDE (IP): Performed by: INTERNAL MEDICINE

## 2017-07-27 PROCEDURE — 71010 DX-CHEST-PORTABLE (1 VIEW): CPT

## 2017-07-27 RX ORDER — CARVEDILOL 6.25 MG/1
25 TABLET ORAL 2 TIMES DAILY WITH MEALS
Status: DISCONTINUED | OUTPATIENT
Start: 2017-07-27 | End: 2017-07-31 | Stop reason: HOSPADM

## 2017-07-27 RX ORDER — POTASSIUM CHLORIDE 7.45 MG/ML
10 INJECTION INTRAVENOUS
Status: COMPLETED | OUTPATIENT
Start: 2017-07-27 | End: 2017-07-27

## 2017-07-27 RX ORDER — FUROSEMIDE 10 MG/ML
40 INJECTION INTRAMUSCULAR; INTRAVENOUS DAILY
Status: DISCONTINUED | OUTPATIENT
Start: 2017-07-27 | End: 2017-07-29

## 2017-07-27 RX ORDER — CLONIDINE HYDROCHLORIDE 0.1 MG/1
0.3 TABLET ORAL EVERY 6 HOURS PRN
Status: DISCONTINUED | OUTPATIENT
Start: 2017-07-27 | End: 2017-07-29

## 2017-07-27 RX ORDER — POTASSIUM CHLORIDE 20 MEQ/1
40 TABLET, EXTENDED RELEASE ORAL ONCE
Status: COMPLETED | OUTPATIENT
Start: 2017-07-27 | End: 2017-07-27

## 2017-07-27 RX ADMIN — MEMANTINE HYDROCHLORIDE 10 MG: 10 TABLET, FILM COATED ORAL at 09:15

## 2017-07-27 RX ADMIN — ACETAMINOPHEN 650 MG: 325 TABLET, FILM COATED ORAL at 20:00

## 2017-07-27 RX ADMIN — CLONIDINE HYDROCHLORIDE 0.3 MG: 0.1 TABLET ORAL at 13:31

## 2017-07-27 RX ADMIN — CLONIDINE HYDROCHLORIDE 0.3 MG: 0.1 TABLET ORAL at 20:00

## 2017-07-27 RX ADMIN — POTASSIUM CHLORIDE 10 MEQ: 7.46 INJECTION, SOLUTION INTRAVENOUS at 20:02

## 2017-07-27 RX ADMIN — AMLODIPINE BESYLATE 10 MG: 10 TABLET ORAL at 09:14

## 2017-07-27 RX ADMIN — ATORVASTATIN CALCIUM 80 MG: 80 TABLET, FILM COATED ORAL at 20:00

## 2017-07-27 RX ADMIN — STANDARDIZED SENNA CONCENTRATE AND DOCUSATE SODIUM 2 TABLET: 8.6; 5 TABLET, FILM COATED ORAL at 20:00

## 2017-07-27 RX ADMIN — MEMANTINE HYDROCHLORIDE 10 MG: 10 TABLET, FILM COATED ORAL at 20:01

## 2017-07-27 RX ADMIN — HYDRALAZINE HYDROCHLORIDE 10 MG: 20 INJECTION INTRAMUSCULAR; INTRAVENOUS at 00:19

## 2017-07-27 RX ADMIN — POTASSIUM CHLORIDE 10 MEQ: 7.46 INJECTION, SOLUTION INTRAVENOUS at 17:01

## 2017-07-27 RX ADMIN — OMEPRAZOLE 20 MG: 20 CAPSULE, DELAYED RELEASE ORAL at 09:15

## 2017-07-27 RX ADMIN — FUROSEMIDE 40 MG: 10 INJECTION, SOLUTION INTRAMUSCULAR; INTRAVENOUS at 17:01

## 2017-07-27 RX ADMIN — HYDRALAZINE HYDROCHLORIDE 20 MG: 20 INJECTION INTRAMUSCULAR; INTRAVENOUS at 13:20

## 2017-07-27 RX ADMIN — LEVETIRACETAM 500 MG: 500 TABLET, FILM COATED ORAL at 09:15

## 2017-07-27 RX ADMIN — LEVETIRACETAM 500 MG: 500 TABLET, FILM COATED ORAL at 20:00

## 2017-07-27 RX ADMIN — LOSARTAN POTASSIUM 100 MG: 50 TABLET, FILM COATED ORAL at 09:14

## 2017-07-27 RX ADMIN — POTASSIUM CHLORIDE 10 MEQ: 7.46 INJECTION, SOLUTION INTRAVENOUS at 19:00

## 2017-07-27 RX ADMIN — STANDARDIZED SENNA CONCENTRATE AND DOCUSATE SODIUM 2 TABLET: 8.6; 5 TABLET, FILM COATED ORAL at 09:15

## 2017-07-27 RX ADMIN — PHENYTOIN SODIUM 600 MG: 100 CAPSULE, EXTENDED RELEASE ORAL at 20:00

## 2017-07-27 RX ADMIN — CARVEDILOL 25 MG: 25 TABLET, FILM COATED ORAL at 09:14

## 2017-07-27 RX ADMIN — POTASSIUM CHLORIDE 10 MEQ: 7.46 INJECTION, SOLUTION INTRAVENOUS at 18:15

## 2017-07-27 RX ADMIN — SODIUM CHLORIDE 10 MG/HR: 9 INJECTION, SOLUTION INTRAVENOUS at 07:27

## 2017-07-27 RX ADMIN — MIRTAZAPINE 30 MG: 15 TABLET, FILM COATED ORAL at 20:00

## 2017-07-27 RX ADMIN — POTASSIUM CHLORIDE 40 MEQ: 1500 TABLET, EXTENDED RELEASE ORAL at 03:46

## 2017-07-27 RX ADMIN — CARVEDILOL 25 MG: 25 TABLET, FILM COATED ORAL at 16:22

## 2017-07-27 RX ADMIN — ENALAPRILAT 2.5 MG: 1.25 INJECTION, SOLUTION INTRAVENOUS at 01:04

## 2017-07-27 ASSESSMENT — ENCOUNTER SYMPTOMS
VOMITING: 0
CHILLS: 0
FEVER: 0
STRIDOR: 0
NAUSEA: 0
FALLS: 0
MYALGIAS: 0
DEPRESSION: 0
SPUTUM PRODUCTION: 0
DIZZINESS: 0
ABDOMINAL PAIN: 0
LOSS OF CONSCIOUSNESS: 0
SHORTNESS OF BREATH: 0
NECK PAIN: 0
PALPITATIONS: 0

## 2017-07-27 ASSESSMENT — PAIN SCALES - GENERAL
PAINLEVEL_OUTOF10: 0
PAINLEVEL_OUTOF10: 3
PAINLEVEL_OUTOF10: 0
PAINLEVEL_OUTOF10: 6
PAINLEVEL_OUTOF10: 0

## 2017-07-27 NOTE — CARE PLAN
Problem: Safety  Goal: Will remain free from injury  Pt remained free from injury during overnight shift

## 2017-07-27 NOTE — PROGRESS NOTES
[facility] Hospitalist Progress Note    Date of Service: 2017    Chief Complaint  77 y.o. male admitted 2017 with ALOC.    Interval Problem Update  Pt with hx of dementia, noted to be altered.  Was in town on vacation.  Noted ICH.  Pt seen in ICU, ICU care given.  Discussed with wife on the phone.  Discussed patient condition and plan with RN, RT and charge nurse / hot rounds.      Consultants/Specialty  neurosx - Dr Marti    Disposition  Patient is critically ill.   The patient continues to have : hypertensive emergency  The vital organ system that is effected is the: neuro initially   If untreated there is a high chance of deterioration into: permanent brain injury or death   The critical care that I am providing today is: nicardipine gtt  The critical care that has been undertaken is medically complex.   There has been no overlap in critical care time.  Critical care time not including procedures, no overlap: 39 minutes        Review of Systems   Constitutional: Negative for fever, chills and malaise/fatigue.   HENT: Negative for congestion.    Respiratory: Negative for sputum production, shortness of breath and stridor.    Cardiovascular: Negative for chest pain and palpitations.   Gastrointestinal: Negative for nausea, vomiting and abdominal pain.   Genitourinary: Negative for dysuria, urgency and frequency.   Musculoskeletal: Negative for myalgias, falls and neck pain.   Neurological: Negative for dizziness and loss of consciousness.   Psychiatric/Behavioral: Negative for depression and suicidal ideas.      Physical Exam  Laboratory/Imaging   Hemodynamics  Temp (24hrs), Av.3 °C (99.1 °F), Min:37.1 °C (98.8 °F), Max:37.5 °C (99.5 °F)   Temperature: 37.1 °C (98.8 °F)  Pulse  Av.2  Min: 58  Max: 109 Heart Rate (Monitored): 69  Blood Pressure : 142/54 mmHg, NIBP: 147/50 mmHg      Respiratory      Respiration: 16, Pulse Oximetry: 91 %     Work Of Breathing / Effort: Mild  RUL Breath Sounds: Clear, RML  Breath Sounds: Clear, RLL Breath Sounds: Diminished, JOSE ENRIQUE Breath Sounds: Clear, LLL Breath Sounds: Diminished    Fluids    Intake/Output Summary (Last 24 hours) at 07/27/17 0838  Last data filed at 07/27/17 0600   Gross per 24 hour   Intake 2795.42 ml   Output    805 ml   Net 1990.42 ml       Nutrition  Orders Placed This Encounter   Procedures   • DIET ORDER     Standing Status: Standing      Number of Occurrences: 1      Standing Expiration Date:      Order Specific Question:  Diet:     Answer:  Cardiac [6]     Physical Exam   Constitutional: He appears well-developed.  Non-toxic appearance. No distress.   HENT:   Head: Normocephalic and atraumatic.   Eyes: Right eye exhibits no discharge. Left eye exhibits no discharge.   Neck: Neck supple. No edema and no erythema present.   Cardiovascular: Normal rate and regular rhythm.  Exam reveals no gallop.    No murmur heard.  Pulmonary/Chest: Effort normal and breath sounds normal. No stridor. No respiratory distress. He has no wheezes. He has no rales.   Abdominal: Soft. Bowel sounds are normal. He exhibits no distension.   Musculoskeletal: He exhibits no edema.   Neurological:   Awake, some confusion    Skin: Skin is warm and dry. He is not diaphoretic.   Psychiatric: He is slowed. Cognition and memory are impaired.   Nursing note and vitals reviewed.      Recent Labs      07/24/17   1312  07/25/17   0615  07/27/17   0200   WBC  12.6*  11.2*  10.2   RBC  4.28*  3.49*  3.28*   HEMOGLOBIN  14.6  11.7*  11.2*   HEMATOCRIT  43.7  34.8*  32.5*   MCV  102.1*  99.7*  99.1*   MCH  34.1*  33.5*  34.1*   MCHC  33.4*  33.6*  34.5   RDW  49.8  47.9  46.5   PLATELETCT  196  183  177   MPV  10.0  10.5  9.9     Recent Labs      07/24/17   1312  07/25/17   0615  07/27/17   0200   SODIUM  137  142  141   POTASSIUM  3.8  3.5*  3.2*   CHLORIDE  106  111  113*   CO2  21  24  20   GLUCOSE  167*  116*  117*   BUN  18  20  25*   CREATININE  1.00  0.93  1.01   CALCIUM  8.8  8.3*  8.3*      Recent Labs      07/24/17   1312   APTT  24.0*   INR  1.05                  Assessment/Plan     * Epidural hematoma (CMS-HCC) (present on admission)  Assessment & Plan  - continue q4 hour neuro checks  - will not require surgery  - additional recommendations per neurosx    Hypertensive emergency (present on admission)  Assessment & Plan  - continue nicardipine gtt  - continue home cozaar, will max coreg  - adjust as needed  - keep sbp < 160 in pt with ICH    Seizure disorder (CMS-HCC) (present on admission)  Assessment & Plan  - continue keppra and dilantin iv  - monitor closely      Leukocytosis (present on admission)  Assessment & Plan  - improved, reactive       Lactic acidosis (present on admission)  Assessment & Plan  - resolved, not infectious     Dyslipidemia (present on admission)  Assessment & Plan  - continue statin    Alzheimer's dementia (present on admission)  Assessment & Plan  - acutely worse d/t ICH    Hyperglycemia (present on admission)  Assessment & Plan  - mild, no need for coverage    Macrocytosis without anemia (present on admission)  Assessment & Plan  - no gross bleeding     Hematuria, microscopic (present on admission)  Assessment & Plan  - resolved      Labs reviewed, Medications reviewed and Radiology images reviewed        DVT Prophylaxis: Contraindicated - High bleeding risk  DVT prophylaxis - mechanical: SCDs  Ulcer prophylaxis: Yes

## 2017-07-27 NOTE — THERAPY
Speech Language Therapy Clinical Swallow Evaluation completed.  Functional Status: Patient seen for swallow evaluation this date.  Per RN, patient very congested and gurgly when eating a late breakfast and there is concern for aspiration.  Furthermore, patient has needed increase in O2 requirements over the course of the day and went from 4L via nasal cannula to 10L via oxy mask.  Patient awake, alert and oriented to person, place, month and year with confusion to event and recent events.  Speech is mildly dysarthric and voice gravely.  There is mild right side facial weakness at rest and with oral motor tasks.  Presentation of PO consisted of ice, nectars, thin liquids, purees and soft solids.  Patient with delayed onset of swallow, weak laryngeal elevation noted on palpation of anterior neck during swallow and audible gurgly vocal quality following swallows of all consistencies with increased gurgles and weak coughing following thin liquids and soft solids all of which is concerning for aspiration.  Family present at bedside and verbalizing that he did have some coughing yesterday when eating and that his speech is a bit more dysarthric than his baseline is.  Extensive education to patient and family regarding aspiration risk and concerns for aspiration with PO intake at this time.  Would recommend NPO except for medications crushed in applesauce pending FEES tomorrow.  Patient and family both in agreement with POC.  Discussed results and recommendations with Dr. Giraldo who gave orders for FEES and NPO.  FEES tomorrow.  RN updated and was present for most of the assessment.  Thank you for the consult.    Recommendations - Diet:  NPO pending FEES tomorrow                          Medication Administration:  Crush in applesauce if cannot be delivered via non oral route  Plan of Care: Will benefit from Speech Therapy 5 times per week  Post-Acute Therapy: Discharge to a transitional care facility for continued skilled  "therapy services vs Discharge to home with outpatient or home health for additional skilled therapy services depending on progress.    See \"Rehab Therapy-Acute\" Patient Summary Report for complete documentation.   "

## 2017-07-27 NOTE — CARE PLAN
Problem: Pain Management  Goal: Pain level will decrease to patient’s comfort goal  Intervention: Educate and implement non-pharmacologic comfort measures. Examples: relaxation, distration, play therapy, activity therapy, massage, etc.  Pt denied need for pharmacological intervention for pain through last 12 hours

## 2017-07-28 PROBLEM — I44.0 AV BLOCK, 1ST DEGREE: Status: ACTIVE | Noted: 2017-07-28

## 2017-07-28 PROCEDURE — A9270 NON-COVERED ITEM OR SERVICE: HCPCS | Performed by: INTERNAL MEDICINE

## 2017-07-28 PROCEDURE — 770020 HCHG ROOM/CARE - TELE (206)

## 2017-07-28 PROCEDURE — G8997 SWALLOW GOAL STATUS: HCPCS | Mod: CI

## 2017-07-28 PROCEDURE — 92526 ORAL FUNCTION THERAPY: CPT

## 2017-07-28 PROCEDURE — A9270 NON-COVERED ITEM OR SERVICE: HCPCS | Performed by: HOSPITALIST

## 2017-07-28 PROCEDURE — G8996 SWALLOW CURRENT STATUS: HCPCS | Mod: CK

## 2017-07-28 PROCEDURE — 700111 HCHG RX REV CODE 636 W/ 250 OVERRIDE (IP): Performed by: INTERNAL MEDICINE

## 2017-07-28 PROCEDURE — 99233 SBSQ HOSP IP/OBS HIGH 50: CPT | Performed by: INTERNAL MEDICINE

## 2017-07-28 PROCEDURE — 700111 HCHG RX REV CODE 636 W/ 250 OVERRIDE (IP): Performed by: HOSPITALIST

## 2017-07-28 PROCEDURE — 700102 HCHG RX REV CODE 250 W/ 637 OVERRIDE(OP): Performed by: HOSPITALIST

## 2017-07-28 PROCEDURE — 700102 HCHG RX REV CODE 250 W/ 637 OVERRIDE(OP): Performed by: INTERNAL MEDICINE

## 2017-07-28 PROCEDURE — 92612 ENDOSCOPY SWALLOW (FEES) VID: CPT

## 2017-07-28 RX ORDER — ISOSORBIDE MONONITRATE 30 MG/1
30 TABLET, EXTENDED RELEASE ORAL ONCE
Status: COMPLETED | OUTPATIENT
Start: 2017-07-28 | End: 2017-07-28

## 2017-07-28 RX ORDER — ISOSORBIDE MONONITRATE 30 MG/1
30 TABLET, EXTENDED RELEASE ORAL
Status: DISCONTINUED | OUTPATIENT
Start: 2017-07-28 | End: 2017-07-28

## 2017-07-28 RX ORDER — POTASSIUM CHLORIDE 7.45 MG/ML
10 INJECTION INTRAVENOUS
Status: COMPLETED | OUTPATIENT
Start: 2017-07-28 | End: 2017-07-28

## 2017-07-28 RX ORDER — ISOSORBIDE MONONITRATE 60 MG/1
60 TABLET, EXTENDED RELEASE ORAL
Status: DISCONTINUED | OUTPATIENT
Start: 2017-07-29 | End: 2017-07-31 | Stop reason: HOSPADM

## 2017-07-28 RX ADMIN — ENALAPRILAT 2.5 MG: 1.25 INJECTION, SOLUTION INTRAVENOUS at 07:34

## 2017-07-28 RX ADMIN — ISOSORBIDE MONONITRATE 30 MG: 30 TABLET, EXTENDED RELEASE ORAL at 15:07

## 2017-07-28 RX ADMIN — ATORVASTATIN CALCIUM 80 MG: 80 TABLET, FILM COATED ORAL at 20:25

## 2017-07-28 RX ADMIN — LEVETIRACETAM 500 MG: 500 TABLET, FILM COATED ORAL at 20:24

## 2017-07-28 RX ADMIN — LOSARTAN POTASSIUM 100 MG: 50 TABLET, FILM COATED ORAL at 08:13

## 2017-07-28 RX ADMIN — STANDARDIZED SENNA CONCENTRATE AND DOCUSATE SODIUM 2 TABLET: 8.6; 5 TABLET, FILM COATED ORAL at 08:12

## 2017-07-28 RX ADMIN — AMLODIPINE BESYLATE 10 MG: 10 TABLET ORAL at 08:13

## 2017-07-28 RX ADMIN — POTASSIUM CHLORIDE 10 MEQ: 7.46 INJECTION, SOLUTION INTRAVENOUS at 10:31

## 2017-07-28 RX ADMIN — LEVETIRACETAM 500 MG: 500 TABLET, FILM COATED ORAL at 08:12

## 2017-07-28 RX ADMIN — PHENYTOIN SODIUM 600 MG: 100 CAPSULE, EXTENDED RELEASE ORAL at 20:25

## 2017-07-28 RX ADMIN — MEMANTINE HYDROCHLORIDE 10 MG: 10 TABLET, FILM COATED ORAL at 08:13

## 2017-07-28 RX ADMIN — HYDRALAZINE HYDROCHLORIDE 10 MG: 20 INJECTION INTRAMUSCULAR; INTRAVENOUS at 23:06

## 2017-07-28 RX ADMIN — HYDRALAZINE HYDROCHLORIDE 20 MG: 20 INJECTION INTRAMUSCULAR; INTRAVENOUS at 05:19

## 2017-07-28 RX ADMIN — HYDRALAZINE HYDROCHLORIDE 20 MG: 20 INJECTION INTRAMUSCULAR; INTRAVENOUS at 15:55

## 2017-07-28 RX ADMIN — CARVEDILOL 25 MG: 25 TABLET, FILM COATED ORAL at 06:59

## 2017-07-28 RX ADMIN — POTASSIUM CHLORIDE 10 MEQ: 7.46 INJECTION, SOLUTION INTRAVENOUS at 09:22

## 2017-07-28 RX ADMIN — MEMANTINE HYDROCHLORIDE 10 MG: 10 TABLET, FILM COATED ORAL at 20:25

## 2017-07-28 RX ADMIN — ISOSORBIDE MONONITRATE 30 MG: 30 TABLET, EXTENDED RELEASE ORAL at 10:31

## 2017-07-28 RX ADMIN — FUROSEMIDE 40 MG: 10 INJECTION, SOLUTION INTRAMUSCULAR; INTRAVENOUS at 08:13

## 2017-07-28 RX ADMIN — CLONIDINE HYDROCHLORIDE 0.3 MG: 0.1 TABLET ORAL at 06:59

## 2017-07-28 RX ADMIN — POTASSIUM CHLORIDE 10 MEQ: 7.46 INJECTION, SOLUTION INTRAVENOUS at 12:41

## 2017-07-28 RX ADMIN — MIRTAZAPINE 30 MG: 15 TABLET, FILM COATED ORAL at 20:25

## 2017-07-28 RX ADMIN — ENALAPRILAT 2.5 MG: 1.25 INJECTION, SOLUTION INTRAVENOUS at 17:52

## 2017-07-28 RX ADMIN — CARVEDILOL 25 MG: 25 TABLET, FILM COATED ORAL at 16:38

## 2017-07-28 RX ADMIN — POTASSIUM CHLORIDE 10 MEQ: 7.46 INJECTION, SOLUTION INTRAVENOUS at 11:36

## 2017-07-28 RX ADMIN — OMEPRAZOLE 20 MG: 20 CAPSULE, DELAYED RELEASE ORAL at 08:13

## 2017-07-28 ASSESSMENT — ENCOUNTER SYMPTOMS
LOSS OF CONSCIOUSNESS: 0
TINGLING: 0
STRIDOR: 0
CHILLS: 0
SPUTUM PRODUCTION: 0
DEPRESSION: 0
PALPITATIONS: 0
COUGH: 0
MYALGIAS: 0
HEADACHES: 0
SHORTNESS OF BREATH: 0
FALLS: 0
NAUSEA: 0
FEVER: 0
DIZZINESS: 0
VOMITING: 0

## 2017-07-28 ASSESSMENT — PAIN SCALES - GENERAL
PAINLEVEL_OUTOF10: 0

## 2017-07-28 ASSESSMENT — LIFESTYLE VARIABLES: DO YOU DRINK ALCOHOL: NO

## 2017-07-28 NOTE — DISCHARGE PLANNING
Discussed dc plans at pt request. Pt is concerned that he will be dc'ed before he is able to return to his home in [place]. Informed pt that the treating physician will not dc him home if the pt is not ready.

## 2017-07-28 NOTE — THERAPY
Speech Language Therapy FEES completed.  Functional Status:  Patient now on 2L of O2 and much less congested today.  He was awake, alert and oriented in all spheres with tangential speech at times.   FEES procedure explained to patient and patient agreed to proceed. Patient tolerated procedure well with frequent cues to sustain attention to task.  Presentation of PO consisted of ice, nectars, thin liquids, purees, soft solids and solids. Patient with ANAHI SILENT PENETRATION AND ASPIRATION BEFORE THE SWALLOW consistently on thin liquids and intermittently on juice from mixed consistencies.  Three second oral prep nor chin tuck alleviated penetration and aspiration episodes.  Patient did have penetration to the laryngeal rim on purees and nectars intermittently, but there was no evidenced of aspiration noted on these textures.  A double swallow was effective in clearing pharyngeal residue.     IMPRESSION: Patient is presenting with oropharyngeal dysphagia evidenced in impaired base of tongue retraction, impaired laryngeal elevation and impaired pharyngeal constriction leading to premature spillage to the pyriform sinuses and laryngeal vestibule on thins and mixed consistencies with delayed onset of swallow and silent penetration and aspiration on thins and mixed consistencies.  Would recommend dysphagia I diet with nectar thick liquids at this time.  SLP will follow for dysphagia therapy.  Patient will need either repeat FEES or an out patient Video Fluoroscopic Swallow Study (Modified Barium Swallow Study) prior to diet upgrade.    Recommendations - Diet: Dysphagia I diet with NECTAR THICK LIQUIDS                          Strategies: Direct supervision during meals, No Straws and Head of Bed at 90 Degrees                          Medication Administration: crush larger pills in applesauce: ok to float smaller ones  Plan of Care: Will benefit from Speech Therapy 5 times per week  Post-Acute Therapy: Discharge to home  "with outpatient or home health for additional skilled therapy services to address dysphagia.  Will need repeat diagnostic swallow evaluation as an outpatient prior to diet upgrade.     See \"Rehab Therapy-Acute\" Patient Summary Report for complete documentation.   "

## 2017-07-28 NOTE — CARE PLAN
Problem: Communication  Goal: The ability to communicate needs accurately and effectively will improve  Outcome: PROGRESSING AS EXPECTED    Problem: Safety  Goal: Will remain free from injury  Outcome: PROGRESSING AS EXPECTED    Problem: Respiratory:  Goal: Respiratory status will improve  Outcome: PROGRESSING AS EXPECTED

## 2017-07-28 NOTE — PROGRESS NOTES
[facility] Hospitalist Progress Note    Date of Service: 2017    Chief Complaint  77 y.o. male admitted 2017 with ALOC.    Interval Problem Update  Pt with hx of dementia, noted to be altered.  Was in town on vacation.  Noted ICH.  Pt seen in ICU, ICU care given.  Discussed with wife on the phone.  Discussed patient condition and plan with RN, RT and charge nurse / hot rounds.      Consultants/Specialty  neurosx - Dr Marti    Disposition  Pt requires additional treatment in the hospital         Review of Systems   Constitutional: Negative for fever and chills.   HENT: Negative for congestion.    Respiratory: Negative for cough, sputum production, shortness of breath and stridor.    Cardiovascular: Negative for chest pain, palpitations and leg swelling.   Gastrointestinal: Negative for nausea and vomiting.   Genitourinary: Negative for dysuria and urgency.   Musculoskeletal: Negative for myalgias and falls.   Neurological: Negative for dizziness, tingling, loss of consciousness and headaches.   Psychiatric/Behavioral: Negative for depression and suicidal ideas.      Physical Exam  Laboratory/Imaging   Hemodynamics  Temp (24hrs), Av.8 °C (98.2 °F), Min:36.2 °C (97.2 °F), Max:37.3 °C (99.2 °F)   Temperature: 37.3 °C (99.2 °F)  Pulse  Av.6  Min: 49  Max: 109 Heart Rate (Monitored): (!) 56  NIBP: 119/47 mmHg      Respiratory      Respiration: 20, Pulse Oximetry: 91 %     Work Of Breathing / Effort: Mild  RUL Breath Sounds: Clear, RML Breath Sounds: Clear, RLL Breath Sounds: Fine Crackles;Diminished, JOSE ENRIQUE Breath Sounds: Clear, LLL Breath Sounds: Fine Crackles;Diminished    Fluids    Intake/Output Summary (Last 24 hours) at 17 0833  Last data filed at 17 0600   Gross per 24 hour   Intake 1056.67 ml   Output   2875 ml   Net -1818.33 ml       Nutrition  Orders Placed This Encounter   Procedures   • DIET NPO     Standing Status: Standing      Number of Occurrences: 1      Standing Expiration Date:       Order Specific Question:  Restrict to:     Answer:  Strict [1]      Comments:  applesauce with meds     Physical Exam   Constitutional:  Non-toxic appearance. No distress.   HENT:   Head: Normocephalic and atraumatic.   Mouth/Throat: No oropharyngeal exudate.   Eyes: Right eye exhibits no discharge. Left eye exhibits no discharge. No scleral icterus.   Neck: Neck supple. No tracheal deviation, no edema and no erythema present.   Cardiovascular: Normal rate and regular rhythm.    No murmur heard.  Pulmonary/Chest: Effort normal and breath sounds normal. No stridor. No respiratory distress. He has no wheezes.   Abdominal: Soft. Bowel sounds are normal. He exhibits no distension. There is no tenderness.   Musculoskeletal: He exhibits no edema or tenderness.   Neurological:   Awake, some confusion    Skin: Skin is warm and dry. He is not diaphoretic. No erythema.   Psychiatric: He is slowed. Cognition and memory are impaired.   Nursing note and vitals reviewed.      Recent Labs      07/27/17   0200   WBC  10.2   RBC  3.28*   HEMOGLOBIN  11.2*   HEMATOCRIT  32.5*   MCV  99.1*   MCH  34.1*   MCHC  34.5   RDW  46.5   PLATELETCT  177   MPV  9.9     Recent Labs      07/27/17   0200   SODIUM  141   POTASSIUM  3.2*   CHLORIDE  113*   CO2  20   GLUCOSE  117*   BUN  25*   CREATININE  1.01   CALCIUM  8.3*                      Assessment/Plan     * Epidural hematoma (CMS-HCC) (present on admission)  Assessment & Plan  - continue q4 hour neuro checks  - will not require surgery  - additional recommendations per neurosx    Hypertensive emergency (present on admission)  Assessment & Plan  - now off nicardipine gtt  - continue home cozaar, max coreg  - add imdur, increase if needed  - adjust as needed  - keep sbp < 160 in pt with ICH    Seizure disorder (CMS-HCC) (present on admission)  Assessment & Plan  - continue keppra and dilantin iv  - monitor closely      Leukocytosis (present on admission)  Assessment & Plan  - improved,  reactive       Lactic acidosis (present on admission)  Assessment & Plan  - resolved, not infectious     AV block, 1st degree  Assessment & Plan  - likely d/t high dose coreg and clonidine prn  - will add imdur to try to stop using clonidine and possibly decrease coreg  - asymptomatic     Pulmonary edema  Assessment & Plan  - improved with lasix, pt now with good sats on RA  - continue lasix and repeat cxr in am  - pt not on lasix at home    Dyslipidemia (present on admission)  Assessment & Plan  - continue statin    Alzheimer's dementia (present on admission)  Assessment & Plan  - was acutely worse d/t ICH  - now likely at baseline     Hyperglycemia (present on admission)  Assessment & Plan  - mild, no need for coverage    Macrocytosis without anemia (present on admission)  Assessment & Plan  - no gross bleeding     Hematuria, microscopic (present on admission)  Assessment & Plan  - resolved      Labs reviewed, Medications reviewed and Radiology images reviewed        DVT Prophylaxis: Contraindicated - High bleeding risk  DVT prophylaxis - mechanical: SCDs  Ulcer prophylaxis: Yes

## 2017-07-28 NOTE — THERAPY
Speech Language Therapy dysphagia treatment completed.   Functional Status:  Asked to see patient for therapy and training/instruction given possible DC over weekend.  Patient was given nectar thick liquids and purees with instructions on double swallow strategy to clear any pharyngeal stasis.  Initially, he needed moderate verbal cues to sustain attention to task and stop talking during the oral phase, but as session progressed, he was able to independently complete double swallow about 70% of the time with min cues and no overt S/Sx of aspiration, changes in vocal quality or changes in vital signs.  Written instructions were provided to patient on diet recommendations (consistency, sample menus, how to prepare pureed foods and nectars, and where to buy thickener).  Further, worked with patient on swallowing exercises to address base of tongue, laryngeal elevation and pharyngeal constriction.  Patient was able to complete 10 reps of all exercises and 5/10 of the Thania with fair to good accuracy.  Again, written instructions were provided to patient on what exercises to do and the frequency (10 times each, 3-5 times a day).  All of patient's questions were answered, and Dr. Giraldo was calling patient's wife to update her on his progress, my recommendations and DC plans.  SLP will continue to follow during acute care stay.  Thank you for allowing me to participate in this patient's care.    Recommendations - Diet: Dysphagia I diet with NECTAR THICK LIQUIDS                          Strategies: Direct supervision during meals, No Straws and Head of Bed at 90 Degrees                          Medication Administration: crush larger pills in applesauce: ok to float smaller ones  Plan of Care: Will benefit from Speech Therapy 5 times per week  Post-Acute Therapy: Discharge to home with outpatient or home health for additional skilled therapy services to address dysphagia.  Will need repeat diagnostic swallow evaluation as  "an outpatient prior to diet upgrade.     See \"Rehab Therapy-Acute\" Patient Summary Report for complete documentation.     "

## 2017-07-28 NOTE — CARE PLAN
Problem: Communication  Goal: The ability to communicate needs accurately and effectively will improve  Outcome: PROGRESSING AS EXPECTED    Problem: Safety  Goal: Will remain free from injury  Outcome: PROGRESSING AS EXPECTED    Problem: Knowledge Deficit  Goal: Knowledge of the prescribed therapeutic regimen will improve  Outcome: PROGRESSING AS EXPECTED

## 2017-07-29 ENCOUNTER — APPOINTMENT (OUTPATIENT)
Dept: RADIOLOGY | Facility: MEDICAL CENTER | Age: 77
DRG: 064 | End: 2017-07-29
Attending: INTERNAL MEDICINE
Payer: MEDICARE

## 2017-07-29 PROBLEM — R13.10 DYSPHAGIA: Status: ACTIVE | Noted: 2017-07-29

## 2017-07-29 PROBLEM — J96.01 ACUTE HYPOXEMIC RESPIRATORY FAILURE (HCC): Status: ACTIVE | Noted: 2017-07-29

## 2017-07-29 PROBLEM — Z91.199 NON COMPLIANCE WITH MEDICAL TREATMENT: Status: ACTIVE | Noted: 2017-07-29

## 2017-07-29 PROBLEM — J81.1 PULMONARY EDEMA: Status: ACTIVE | Noted: 2017-07-29

## 2017-07-29 PROBLEM — S06.5XAA SUBDURAL HEMATOMA (HCC): Status: ACTIVE | Noted: 2017-07-24

## 2017-07-29 LAB
BACTERIA BLD CULT: NORMAL
BACTERIA BLD CULT: NORMAL
SIGNIFICANT IND 70042: NORMAL
SIGNIFICANT IND 70042: NORMAL
SITE SITE: NORMAL
SITE SITE: NORMAL
SOURCE SOURCE: NORMAL
SOURCE SOURCE: NORMAL

## 2017-07-29 PROCEDURE — 700111 HCHG RX REV CODE 636 W/ 250 OVERRIDE (IP): Performed by: INTERNAL MEDICINE

## 2017-07-29 PROCEDURE — 700102 HCHG RX REV CODE 250 W/ 637 OVERRIDE(OP): Performed by: INTERNAL MEDICINE

## 2017-07-29 PROCEDURE — A9270 NON-COVERED ITEM OR SERVICE: HCPCS | Performed by: HOSPITALIST

## 2017-07-29 PROCEDURE — 770020 HCHG ROOM/CARE - TELE (206)

## 2017-07-29 PROCEDURE — 71010 DX-CHEST-PORTABLE (1 VIEW): CPT

## 2017-07-29 PROCEDURE — A9270 NON-COVERED ITEM OR SERVICE: HCPCS | Performed by: INTERNAL MEDICINE

## 2017-07-29 PROCEDURE — 99233 SBSQ HOSP IP/OBS HIGH 50: CPT | Performed by: INTERNAL MEDICINE

## 2017-07-29 PROCEDURE — 700102 HCHG RX REV CODE 250 W/ 637 OVERRIDE(OP): Performed by: HOSPITALIST

## 2017-07-29 RX ORDER — LEVETIRACETAM 100 MG/ML
750 SOLUTION ORAL EVERY 12 HOURS
Status: DISCONTINUED | OUTPATIENT
Start: 2017-07-29 | End: 2017-07-31 | Stop reason: HOSPADM

## 2017-07-29 RX ORDER — LEVETIRACETAM 250 MG/1
750 TABLET ORAL 2 TIMES DAILY
Status: DISCONTINUED | OUTPATIENT
Start: 2017-07-29 | End: 2017-07-29

## 2017-07-29 RX ORDER — LEVETIRACETAM 750 MG/1
750 TABLET ORAL 2 TIMES DAILY
Qty: 60 TAB | Refills: 0 | Status: SHIPPED | OUTPATIENT
Start: 2017-07-29

## 2017-07-29 RX ORDER — CARVEDILOL 25 MG/1
25 TABLET ORAL 2 TIMES DAILY WITH MEALS
Qty: 60 TAB | Refills: 0 | Status: SHIPPED | OUTPATIENT
Start: 2017-07-29

## 2017-07-29 RX ORDER — ISOSORBIDE MONONITRATE 60 MG/1
60 TABLET, EXTENDED RELEASE ORAL DAILY
Qty: 30 TAB | Refills: 0 | Status: SHIPPED | OUTPATIENT
Start: 2017-07-29

## 2017-07-29 RX ADMIN — LEVETIRACETAM 750 MG: 100 SOLUTION ORAL at 21:55

## 2017-07-29 RX ADMIN — AMLODIPINE BESYLATE 10 MG: 10 TABLET ORAL at 10:02

## 2017-07-29 RX ADMIN — PHENYTOIN SODIUM 600 MG: 100 CAPSULE, EXTENDED RELEASE ORAL at 21:54

## 2017-07-29 RX ADMIN — OMEPRAZOLE 20 MG: 20 CAPSULE, DELAYED RELEASE ORAL at 10:02

## 2017-07-29 RX ADMIN — LOSARTAN POTASSIUM 100 MG: 50 TABLET, FILM COATED ORAL at 10:02

## 2017-07-29 RX ADMIN — MEMANTINE HYDROCHLORIDE 10 MG: 10 TABLET, FILM COATED ORAL at 10:02

## 2017-07-29 RX ADMIN — ISOSORBIDE MONONITRATE 60 MG: 60 TABLET ORAL at 10:02

## 2017-07-29 RX ADMIN — LEVETIRACETAM 500 MG: 500 TABLET, FILM COATED ORAL at 10:02

## 2017-07-29 RX ADMIN — FUROSEMIDE 40 MG: 10 INJECTION, SOLUTION INTRAMUSCULAR; INTRAVENOUS at 10:00

## 2017-07-29 RX ADMIN — CARVEDILOL 25 MG: 25 TABLET, FILM COATED ORAL at 10:01

## 2017-07-29 ASSESSMENT — ENCOUNTER SYMPTOMS
CHILLS: 0
VOMITING: 0
DEPRESSION: 0
HEARTBURN: 0
NAUSEA: 0
FLANK PAIN: 0
STRIDOR: 0
FOCAL WEAKNESS: 0
PALPITATIONS: 0
DIARRHEA: 0
SPUTUM PRODUCTION: 0
ABDOMINAL PAIN: 0
TREMORS: 0
CONSTIPATION: 0
DIZZINESS: 0
FEVER: 0
SENSORY CHANGE: 0
SHORTNESS OF BREATH: 0
MYALGIAS: 0
DOUBLE VISION: 0
COUGH: 0
BLOOD IN STOOL: 0
BLURRED VISION: 0
HEADACHES: 0
FALLS: 0
TINGLING: 0
LOSS OF CONSCIOUSNESS: 0
SPEECH CHANGE: 0
SEIZURES: 1

## 2017-07-29 ASSESSMENT — PAIN SCALES - GENERAL
PAINLEVEL_OUTOF10: 0

## 2017-07-29 ASSESSMENT — LIFESTYLE VARIABLES: EVER_SMOKED: NEVER

## 2017-07-29 NOTE — CARE PLAN
Problem: Safety  Goal: Will remain free from injury  Outcome: PROGRESSING AS EXPECTED  Bed strip alarm in place. Pt able to use call light appropriately.     Problem: Discharge Barriers/Planning  Goal: Patient’s continuum of care needs will be met  Outcome: PROGRESSING AS EXPECTED  Pt lives 5 hours away. Will need to plan discharge home early in day to accommodate drive.

## 2017-07-29 NOTE — PROGRESS NOTES
Regi Almodovar Fall Risk Assessment:     Last Known Fall: Within the last month  Mobility: Use of assistive device/requires assist of two people  Medications: Diuretics  Mental Status/LOC/Awareness: Awake, alert, and oriented to date, place, and person  Toileting Needs: Use of catheters or diversion devices  Volume/Electrolyte Status: No problems  Communication/Sensory: Visual (Glasses)/hearing deficit  Behavior: Appropriate behavior  Regi Almodovar Fall Risk Total: 14  Fall Risk Level: MODERATE RISK    Universal Fall Precautions:  call light/belongings in reach    Fall Risk Level Interventions:    TRIAL (TELE 8, NEURO, MED YAYA 5) Moderate Fall Risk Interventions  Place yellow fall risk ID band on patient: verified  Provide patient/family education based on risk assessment : verified  Educate patient/family to call staff for assistance when getting out of bed: verified  Place fall precaution signage outside patient door: verified  Utilize bed/chair fall alarm: verified     Patient Specific Interventions:     Medication: review medications with patient and family  Mental Status/LOC/Awareness: reinforce falls education  Toileting: provide frquent toileting  Volume/Electrolyte Status: ensure patient remains hydrated  Communication/Sensory: update plan of care on whiteboard  Behavioral: instruct/reinforce fall program rationale  Mobility: utilize bed/chair fall alarm and ensure bed is locked and in lowest position

## 2017-07-29 NOTE — PROGRESS NOTES
Pt ready for transfer to room 190 with cardiac monitoring. Report given to Michelle Sewell RN. All questions and concerns answered. Pt's wife aware of transfer.

## 2017-07-29 NOTE — CARE PLAN
Problem: Safety  Goal: Will remain free from falls  Bed locked in lowest position. Call light within reach. Bed alarm activated.      Problem: Urinary Elimination:  Goal: Ability to reestablish a normal urinary elimination pattern will improve  henry in place draining clear yellow urine

## 2017-07-29 NOTE — ASSESSMENT & PLAN NOTE
- Leading to hypoxemia  - Patient refusing further monitoring of BMP or Chest XRAY  - Hold diuresis. If patient is compliant then can further initiate diuresis or recommend on discharge

## 2017-07-29 NOTE — PROGRESS NOTES
Pt arrived to floor. Slid to bed via sheets. Pt is A+Ox4. PERRLA. GREGORIO. Denies pain or n/v. C/o n/t in BLE. Tele monitor in place. Medicated for BP. Multiple bruises and abrasions noted t/o. Skin tear in LUE, dressing in place. Red sacrum. Blanching. Heels floated. All questions answered regarding POC.

## 2017-07-29 NOTE — PROGRESS NOTES
Monitor Summary: SR 60-65, OH .16, QRS .08, QT .34 with 1.5-1.7 second pause & PVC per strip from monitor room

## 2017-07-29 NOTE — DISCHARGE PLANNING
Medical Social Work    This  received report from Hospitalist RN stating that pt needs home O2 to discharge.  This  staffed case with SS Supervisor Lis who states that due to pt having [employer] insurance it will have to be arranged Monday due to [facility] that assist with this process are closed for the weekend.    This  then staffed case with RAMON Haines who states the same as above.    Updated Hospitalist RN.  This  will pass report for Monday's Unit SW.

## 2017-07-29 NOTE — FACE TO FACE
Face to Face Supporting Documentation - Home Health    The encounter with this patient was in whole or in part the primary reason for home health admission.    Date of encounter:   Patient:                    MRN:                       YOB: 2018  Alex Bond  [id number]  1940     Home health to see patient for:  Skilled Nursing care for assessment, interventions & education, Medical social work consult, Home health aide, Physical Therapy evaluation and treatment, Occupational therapy evaluation and treatment and Speech Language Pathology evaluation and treatment    Skilled need for:  Comment: Debility. Sub dural hematoma. Dementia.     Skilled nursing interventions to include:  Comment: Monitor of blood pressure. Medication education.     Homebound status evidenced by:  Needs the assistance of another person in order to leave the home. Leaving home requires a considerable and taxing effort. There is a normal inability to leave the home.    Community Physician to provide follow up care: No primary care provider on file.     Optional Interventions? No      I certify the face to face encounter for this home health care referral meets the CMS requirements and the encounter/clinical assessment with the patient was, in whole, or in part, for the medical condition(s) listed above, which is the primary reason for home health care. Based on my clinical findings: the service(s) are medically necessary, support the need for home health care, and the homebound criteria are met.  I certify that this patient has had a face to face encounter by myself.  Tan Armstrong M.D. - NPI: [id number]

## 2017-07-29 NOTE — DISCHARGE PLANNING
Medical Social Work    This  received report that pt needs home health.  Patient has [employer] insurance and will need it arranged in [place].  This  called [facility] [place] [phone].  They are closed on the weekend.  This  staffed case with RAMON Haines.  Per RAMON Lawrence requires the referral to come from their health system and the referral will need to go through pt's [place] PCP.  Updated Hospitalist RN on above.      Pt will need to see his PCP to get Home Health services.

## 2017-07-29 NOTE — PROGRESS NOTES
Shift summary    A&Ox4.    Card: SR/1degree AVB/  BBB  Systolic BP elevates to 180-190's and treated with hydralazine and Vasotec.  Resp: 2L NC  Wells with clear yellow urine.    Orders to transfer to neuro.  Will continue to monitor.

## 2017-07-30 PROCEDURE — 700102 HCHG RX REV CODE 250 W/ 637 OVERRIDE(OP): Performed by: INTERNAL MEDICINE

## 2017-07-30 PROCEDURE — A9270 NON-COVERED ITEM OR SERVICE: HCPCS | Performed by: INTERNAL MEDICINE

## 2017-07-30 PROCEDURE — 700111 HCHG RX REV CODE 636 W/ 250 OVERRIDE (IP): Performed by: HOSPITALIST

## 2017-07-30 PROCEDURE — 700102 HCHG RX REV CODE 250 W/ 637 OVERRIDE(OP): Performed by: HOSPITALIST

## 2017-07-30 PROCEDURE — 700111 HCHG RX REV CODE 636 W/ 250 OVERRIDE (IP): Performed by: INTERNAL MEDICINE

## 2017-07-30 PROCEDURE — 770020 HCHG ROOM/CARE - TELE (206)

## 2017-07-30 PROCEDURE — 99233 SBSQ HOSP IP/OBS HIGH 50: CPT | Performed by: INTERNAL MEDICINE

## 2017-07-30 PROCEDURE — A9270 NON-COVERED ITEM OR SERVICE: HCPCS | Performed by: HOSPITALIST

## 2017-07-30 RX ADMIN — AMLODIPINE BESYLATE 10 MG: 10 TABLET ORAL at 08:36

## 2017-07-30 RX ADMIN — MEMANTINE HYDROCHLORIDE 10 MG: 10 TABLET, FILM COATED ORAL at 20:03

## 2017-07-30 RX ADMIN — LOSARTAN POTASSIUM 100 MG: 50 TABLET, FILM COATED ORAL at 08:36

## 2017-07-30 RX ADMIN — LEVETIRACETAM 750 MG: 100 SOLUTION ORAL at 20:02

## 2017-07-30 RX ADMIN — ATORVASTATIN CALCIUM 80 MG: 80 TABLET, FILM COATED ORAL at 20:03

## 2017-07-30 RX ADMIN — ENALAPRILAT 2.5 MG: 1.25 INJECTION, SOLUTION INTRAVENOUS at 20:04

## 2017-07-30 RX ADMIN — PHENYTOIN SODIUM 600 MG: 100 CAPSULE, EXTENDED RELEASE ORAL at 20:02

## 2017-07-30 RX ADMIN — CARVEDILOL 25 MG: 25 TABLET, FILM COATED ORAL at 08:36

## 2017-07-30 RX ADMIN — MEMANTINE HYDROCHLORIDE 10 MG: 10 TABLET, FILM COATED ORAL at 08:36

## 2017-07-30 RX ADMIN — HYDRALAZINE HYDROCHLORIDE 20 MG: 20 INJECTION INTRAMUSCULAR; INTRAVENOUS at 01:38

## 2017-07-30 RX ADMIN — CARVEDILOL 25 MG: 25 TABLET, FILM COATED ORAL at 17:39

## 2017-07-30 RX ADMIN — ISOSORBIDE MONONITRATE 60 MG: 60 TABLET ORAL at 08:36

## 2017-07-30 RX ADMIN — OMEPRAZOLE 20 MG: 20 CAPSULE, DELAYED RELEASE ORAL at 08:36

## 2017-07-30 RX ADMIN — MIRTAZAPINE 30 MG: 15 TABLET, FILM COATED ORAL at 20:04

## 2017-07-30 RX ADMIN — STANDARDIZED SENNA CONCENTRATE AND DOCUSATE SODIUM 2 TABLET: 8.6; 5 TABLET, FILM COATED ORAL at 08:36

## 2017-07-30 RX ADMIN — ENALAPRILAT 2.5 MG: 1.25 INJECTION, SOLUTION INTRAVENOUS at 06:00

## 2017-07-30 RX ADMIN — LEVETIRACETAM 750 MG: 100 SOLUTION ORAL at 08:41

## 2017-07-30 ASSESSMENT — ENCOUNTER SYMPTOMS
SEIZURES: 1
DOUBLE VISION: 0
LOSS OF CONSCIOUSNESS: 0
ABDOMINAL PAIN: 0
TINGLING: 0
DIARRHEA: 0
HEARTBURN: 0
CONSTIPATION: 0
COUGH: 0
SENSORY CHANGE: 0
HEADACHES: 0
TREMORS: 0
NAUSEA: 0
SPEECH CHANGE: 0
BLOOD IN STOOL: 0
DIZZINESS: 0
DEPRESSION: 0
FALLS: 0
FEVER: 0
VOMITING: 0
PALPITATIONS: 0
SHORTNESS OF BREATH: 0
BLURRED VISION: 0
FOCAL WEAKNESS: 0
CHILLS: 0
FLANK PAIN: 0
MYALGIAS: 0
SPUTUM PRODUCTION: 0
STRIDOR: 0

## 2017-07-30 ASSESSMENT — PAIN SCALES - GENERAL
PAINLEVEL_OUTOF10: 0

## 2017-07-30 NOTE — PROGRESS NOTES
Monitor Summary: SR 63-72, TX .26, QRS .14, QT .42 with frequent PVC,frequent PAC & occasional couplet per strip from monitor room

## 2017-07-30 NOTE — PROGRESS NOTES
Regi Almodovar Fall Risk Assessment:     Last Known Fall: Within the last month  Mobility: Dizziness/generalized weakness  Medications: Cardiovascular or central nervous system meds  Mental Status/LOC/Awareness: Awake, alert, and oriented to date, place, and person  Toileting Needs: No needs  Volume/Electrolyte Status: No problems  Communication/Sensory: Visual (Glasses)/hearing deficit  Behavior: Appropriate behavior  Regi Almodovar Fall Risk Total: 9  Fall Risk Level: LOW RISK    Universal Fall Precautions:  call light/belongings in reach, wheelchairs and assistive devices out of sight, bed in low position and locked    Fall Risk Level Interventions:   TRIAL (TELE 8, NEURO, MED YAYA 5) Low Fall Risk Interventions  Place yellow fall risk ID band on patient: completed  Provide patient/family education based on risk assessment: completed  Educate patient/family to call staff for assistance when getting out of bed: completed  Place fall precaution signage outside patient door: completedTRIAL (TELE 8, NEURO, MED YAYA 5) Moderate Fall Risk Interventions  Place yellow fall risk ID band on patient: refused  Provide patient/family education based on risk assessment : completed  Educate patient/family to call staff for assistance when getting out of bed: completed  Place fall precaution signage outside patient door: completed  Utilize bed/chair fall alarm: completedTRIAL (TELE 8, NEURO, [place]kle YAYA 5) High Fall Risk Interventions  Place yellow fall risk ID band on patient: refused  Provide patient/family education based on risk assessment: completed  Educate patient/family to call staff for assistance when getting out of bed: completed  Place fall precaution signage outside patient door: completed  Place patient in room close to nursing station: completed  Utilize bed/chair fall alarm: completed  Notify charge of high risk for huddle: completed    Patient Specific Interventions:     Medication: review medications with  patient and family  Mental Status/LOC/Awareness: reorient patient and reinforce falls education  Toileting: consider obtaining elevated toilet seat or bedside commode (BSC) and provide frquent toileting  Volume/Electrolyte Status: ensure patient remains hydrated and advance diet as tolerated  Communication/Sensory: update plan of care on whiteboard  Behavioral: encourage patient to voice feelings and engage patient in daily activities  Mobility: schedule physical activity throughout the day and provide comfort measures during transport

## 2017-07-30 NOTE — PROGRESS NOTES
[facility] Hospitalist Progress Note    Date of Service: 7/30/2017    Chief Complaint  77 y.o. male admitted 7/24/2017 with ALOC. Patient is from [place], [place]. Visiting [place]. History seizure disorder and Alzheimer's dementia. Seizure in bed, fell off bed (unclear if hit his head). Evaluated at [facility], CTH revealed a SDH. Patient was transferred to [facility] ER. Dr Moreno, ERP discussed case with Dr Marti from neurosurgery and no surgical interventions per Dr Marti. Trauma team evaluated patient during hospital stay and no concerns from their standpoint. Patient was admitted to the ICU and cared for in the ICU setting. HTN emergency / Uncontrolled HTN during ICU stay requiring nicardipine gtt. Initiated on carvedilol, Imdur in addition for BP control. Stabilized and transferred to neurology RNF. SLP evaluation during hospital stay has revealed swallow deficits / dysphagia for which dietary modifications and diet per SLP has been initiated.     Interval Problem Update  Patient seen and evaluated on rounds today. He has refused further medical intervention / treatments during hospital stay. He is pending discharge arrangement of O2. Refused all his medications last night. BP is higher this am. After PO treatment if remains higher will consider adding treatment regimen. Discharge planned for tomorrow. SW is working on arranging oxygen. A&O x 4. Again informed me he is not interested in any further blood draws, imaging or new interventions during his hospital course. More calm today. Will be discharged tomorrow with his wife. Agreed to taking his PO regimen at this point in time.       Addendum to above.     Extensive discussion with above, recent hospitalization events and POC with patient's spouse. All questions / concerns answered. She will to talk to her  also and address issues. She will be here tomorrow.       Consultants/Specialty  Neurosurgery - Dr Marti  Trauma - Dr Loco    Disposition  Per patient  wishes. HHC to be arranged if agreeable to patient. Home oxygen to be arranged if agreeable to patient.     Time spend: 40 minutes. > 50 % time spend providing counseling / co ordination of care.        Review of Systems   Constitutional: Negative for fever and chills.   HENT: Negative for congestion.    Eyes: Negative for blurred vision and double vision.   Respiratory: Negative for cough, sputum production, shortness of breath and stridor.    Cardiovascular: Negative for chest pain, palpitations and leg swelling.   Gastrointestinal: Negative for heartburn, nausea, vomiting, abdominal pain, diarrhea, constipation, blood in stool and melena.   Genitourinary: Negative for dysuria, urgency, frequency, hematuria and flank pain.   Musculoskeletal: Negative for myalgias and falls.   Skin: Negative for itching and rash.   Neurological: Positive for seizures (Hx of came with). Negative for dizziness, tingling, tremors, sensory change, speech change, focal weakness, loss of consciousness and headaches.   Psychiatric/Behavioral: Negative for depression and suicidal ideas.        Agitation. Rude behavior      Physical Exam  Laboratory/Imaging   Hemodynamics  Temp (24hrs), Av °C (98.6 °F), Min:36.9 °C (98.5 °F), Max:37 °C (98.6 °F)   Temperature: 37 °C (98.6 °F)  Pulse  Av.4  Min: 49  Max: 109    Blood Pressure : (!) 187/63 mmHg (rn notified)      Respiratory      Respiration: 17, Pulse Oximetry: 94 %             Fluids    Intake/Output Summary (Last 24 hours) at 17 1043  Last data filed at 17 0600   Gross per 24 hour   Intake      0 ml   Output   2500 ml   Net  -2500 ml       Nutrition  Orders Placed This Encounter   Procedures   • DIET ORDER     Standing Status: Standing      Number of Occurrences: 1      Standing Expiration Date:      Order Specific Question:  Diet:     Answer:  Regular [1]     Order Specific Question:  Texture/Fiber modifications:     Answer:  Dysphagia 1(Pureed)specify fluid  "consistency(question 6) [1]     Order Specific Question:  Consistency/Fluid modifications:     Answer:  Nectar Thick [2]     Order Specific Question:  Miscellaneous modifications:     Answer:  SLP - 1:1 Supervision by Nursing [21]     Order Specific Question:  Miscellaneous modifications:     Answer:  SLP - Deliver to Nursing Station [22]     Physical Exam   Constitutional: He is oriented to person, place, and time. He appears well-developed and well-nourished.  Non-toxic appearance. No distress.   HENT:   Head: Normocephalic and atraumatic.   Mouth/Throat: Oropharynx is clear and moist. No oropharyngeal exudate.   Eyes: Conjunctivae are normal. Pupils are equal, round, and reactive to light. No scleral icterus.   Neck: Neck supple. No JVD present. No edema and no erythema present.   Cardiovascular: Normal rate and regular rhythm.    Murmur heard.  Pulmonary/Chest: Effort normal. No stridor. No respiratory distress. He has no wheezes. He has rales (Minimal b/l basal).   Abdominal: Soft. Bowel sounds are normal. He exhibits no distension. There is no tenderness. There is no rebound and no guarding.   Musculoskeletal: He exhibits no edema or tenderness.   Neurological: He is alert and oriented to person, place, and time.   Cognitive deficits. At time irrational behavior. Frustrated to leave the hospital   Skin: Skin is warm and dry. He is not diaphoretic. No erythema.   Psychiatric: He has a normal mood and affect. Judgment and thought content normal. He is slowed. Cognition and memory are impaired.   Nursing note and vitals reviewed.                               Assessment/Plan     * Subdural hematoma (CMS-HCC) (present on admission)  Assessment & Plan  - CT head 07/25/2017 revealing \"Stable appearing left lateral sylvian frontal-temporal hemorrhage most consistent with a small subdural hematoma. Measures 10 mm with no significant change from 7/24/2017. Moderate cerebral atrophy. Old lacunar infarcts. Advanced " "supratentorial white matter disease most consistent with microvascular ischemic change. Probable old MCA territory infarcts with encephalomalacic change in the right hemisphere primarily in the right frontal operculum. No evidence of new hemorrhage since the prior exam\"  - Continue q4 hour neuro checks  - No surgical interventions per NS team  - Stat CT head if any change in neurological status  - Outpatient CT in two week with PCP  - Keep SBP < 150  - Hold ASA until repeat CT head in outpatient setting    Seizure disorder (CMS-HCC) (present on admission)  Assessment & Plan  - Increased Keppra to 750 mg BID  - Continue phenytoin ER  - Outpatient neurology evaluation post discharge      Uncontrolled hypertension (present on admission)  Assessment & Plan  - Continue baseline regimen of Losartan 100 mg daily and amlodipine 10 mg daily  - Carvedilol 25 mg BID and IMDUR 60 mg added  - Continue to monitor  - Aim SBP < 150  - Continue to titrate regimen to achieve above goals    Pulmonary edema (present on admission)  Assessment & Plan  - Leading to hypoxemia  - Patient refusing further monitoring of BMP or Chest XRAY  - Hold diuresis. If patient is compliant then can further initiate diuresis or recommend on discharge    Acute hypoxemic respiratory failure (CMS-HCC) (present on admission)  Assessment & Plan  - Related to altitude related hypoxia and pulmonary edema  - Patient refusing further blood draws, CXRAY and TTE  - O2 as acceptable to patient  - Arrange O2 on discharge if acceptable to patient    Dyslipidemia (present on admission)  Assessment & Plan  - continue statin    Alzheimer's dementia (present on admission)  Assessment & Plan  - Avoid sedative, narcotics / BZDs as able      Leukocytosis (present on admission)  Assessment & Plan  - improved, reactive       Hyperglycemia (present on admission)  Assessment & Plan  - mild, no need for coverage    Macrocytosis without anemia (present on admission)  Assessment & " Plan  - Would check B12 / Folate but patient refusing blood draws  - With underlying dementia would recommend this  - Recommend PCP review the case    Lactic acidosis (present on admission)  Assessment & Plan  - resolved, not infectious     Hematuria, microscopic (present on admission)  Assessment & Plan  - resolved    Proteinuria (present on admission)  Assessment & Plan  - F/U PCP  - Control HTN    AV block, 1st degree (present on admission)  Assessment & Plan  - Asymptomatic  - Monitor  - If issues consider weaning of BB and considering alternative therapy    Non compliance with medical treatment (present on admission)  Assessment & Plan  - Counseled and educated    Dysphagia (present on admission)  Assessment & Plan  - Diet per SLP  - Continue outpatient SLP evaluation and treatments      Labs reviewed, Medications reviewed and Radiology images reviewed        DVT Prophylaxis: Contraindicated - High bleeding risk  DVT prophylaxis - mechanical: SCDs  Ulcer prophylaxis: Yes          Time spend: 40 minutes. > 50 % time spend providing counseling / co ordination of care.

## 2017-07-30 NOTE — PROGRESS NOTES
Monitor summary: SR 60-68, MI .20, QRS .12, QT .42, with rare PACc/PVCs and BBB per strip from monitor room.

## 2017-07-30 NOTE — PROGRESS NOTES
Patient is now on continuous pulse ox.  He was refusing everything before.  He is sat'ing 94% on 4L.  It varies from 90-97% with the slightest movement.

## 2017-07-30 NOTE — PROGRESS NOTES
from [place] called and she is ordering the home oxygen concentrator and the 3 tanks of oxygen he will need for the 5 hour drive home.  Pt will most likely dc tomorrow.

## 2017-07-30 NOTE — CARE PLAN
Problem: Safety  Goal: Free from accidental injury  Outcome: MET Date Met:  07/30/17  Goal: Free from intentional harm  Outcome: MET Date Met:  07/30/17  Goal: Free from self harm  Outcome: MET Date Met:  07/30/17  Goal: Isolation Precautions for patient and staff safety  Outcome: MET Date Met:  07/30/17    Problem: Psychosocial Needs:  Goal: Level of anxiety will decrease  Outcome: MET Date Met:  07/30/17

## 2017-07-30 NOTE — PROGRESS NOTES
Pt is irritable and agitated. Refusing nurse to perform proper assessment. Pt is refusing to use call light for help, impulsive getting out of bed to use restroom. Bed alarm on. Pt is refusing to wear oxygen despite education. Refusing to wear  despite education from RN.      IS encouraged by RN. Pt displays proper use.

## 2017-07-30 NOTE — RESPIRATORY CARE
COPD EDUCATION by COPD CLINICAL EDUCATOR  7/30/2017 at 7:55 AM by Nargis Soto     Patient interviewed by COPD education team. Patient refused COPD program at this time.

## 2017-07-30 NOTE — PROGRESS NOTES
[facility] Hospitalist Progress Note    Date of Service: 7/29/2017    Chief Complaint  77 y.o. male admitted 7/24/2017 with ALOC. Patient is from [place], [place]. Visiting [place]. History seizure disorder and Alzheimer's dementia. Seizure in bed, fell off bed (unclear if hit his head). Evaluated at [facility], [facility] revealed a SDH. Patient was transferred to [facility] ER. Dr Moreno, ERP discussed case with Dr Marti from neurosurgery and no surgical interventions per Dr Marti. Trauma team evaluated patient during hospital stay and no concerns from their standpoint. Patient was admitted to the ICU and cared for in the ICU setting. HTN emergency / Uncontrolled HTN during ICU stay requiring nicardipine gtt. Initiated on carvedilol, Imdur in addition for BP control. Stabilized and transferred to neurology RNF. SLP evaluation during hospital stay has revealed swallow deficits / dysphagia for which dietary modifications and diet per SLP has been initiated.     Interval Problem Update  Patient seen and evaluated on rounds today. He is A&O x 4. Noted history of dementia but upon evaluation he is rationale and comprehends his own care. Slight cognitive deficits are apparent but I do not believe this hinders his ability at this point not to be able to make his own decisions. Upon evaluation today, patient declined having any complaints. Declined any further evaluations or interventions during his hospital stay and requested we discharge him. Given this we called his wife who would be driving from [place] to come and  the patient. Home health care orders placed by me. Patient hypoxemic, likely altitude related. Given this plans to arrange oxygen on discharge. Notified by  that unable to obtain authorization through [employer] (his insurance) at this point and unlikely that oxygen can be arranged, which medically was believed to be essential for a safe discharge. Given this it was revealed to the patient and his wife on phone,  that discharge plan would likely be held until O2 can be arranged for the patient.     Later patient is severely agitated and upset regarding plans to cancel his discharge. He is extremely rude to the hospital staff now and cursing us. I went to bedside to have a detailed discussion with the patient. I explained the situation and why we are limited. He does not want to be on oxygen and reports he does not want to go home on oxygen. I discussed with patient the benefits of oxygen therapy including his own personal safety and medical benefits. I discussed the risks of discharge and being without oxygen. Patient verbalized understanding of these and wanted to be discharge without oxygen. Given this I informed the patient that if these are his wishes we would facilitate it. If his wife would like to come and pick him up, I would discharge him. If he would like to be discharged and arrange his own transportation back home at this hour or arrange for a stay at hotel, I would still discharge him in line with his wishes. He wants me to call his wife and have her come back. He is upset that his wife would not come now. I discussed with patient that he should personally discuss the issue with his wife and make his wishes clear and if she would like to come and pick him he would be discharged by me. If not he can stay in the hospital as long as we establish a plan for him which he is agreeable to. I have extensively apologized to the patient regarding any frustration or distress these issues have caused him, though none of these have been caused by me or the hospital or the hospital staff.     Further I discussed with patient that he was on IV lasix for findings of pulmonary edema on Chest XRAY. I have recommended an interval chest XRAY, evaluation of renal function / electrolytes to monitor adverse effects related to IV diuresis and recommended we obtain an Echocardiogram to r/o cardiac etiologies of pulmonary edema given  findings of cardiomegaly on CXRAY. Further I discussed the need to maintain neurological checks during hospital stay and obtain a CT head if indicated if any neurological worsening noted at any point by nursing staff. I discussed the benefits to obtaining these evaluations with patient. I discussed the risks of non compliance including potentially cardiac arrest, respiratory compromise, major electrolyte disturbance and renal failure and potentially death. He verbalized understanding of the risks involved. Alternatively I informed him, if he does not want monitoring I would not be able to continue diuresis or most of the treatment. Patient replied that he does not want any more blood draws during his hospital stay. He declined for a chest xray to be obtained. He declined echocardiogram. He declines to wear oxygen any further. At this point I do believe that the patient has the right to refuse these interventions as he is able to make his own medical decisions. He is upset and irrational because he wants to go home and his wife would not come and pick him up. He blames this on us and would not further comply with any of medical interventions recommended to him. I have tried my level best to  and educate him.     At this point our plan is to hold further medical testing per patient request. I would hold further IV diuresis given no way to monitor this. Given his seizure at home I would increase his baseline Keppra to 750 mg BID. Continue phenytoin ER at baseline dose. Continue anti HTN control with amlodipine, Carvedilol, Losartan and IMDUR. Discontinue henry catheter. Implement bladder protocol. If patient does not want oxygen and wants to be discharged without oxygen (he has already verbalized understanding of the risks) he can be discharged with medical recommendations. If he wants us to arrange oxygen and it cannot be arranged, yet wants to be discharged, his discharge should be considered AMA.     I have  requested the patient in addition that we as a facility and health care providers are caring for other patient's on the floor and his room. I have requested that he please do not disrupt the calmness of hospital floor to allow our other patient to relax and continue to heal. I have extensively apologized to him.     I have written prescriptions for changes in his medications.     His discharge instructions are as reviewed below.   1) Follow up with your PCP in one week of hospital discharge.    2) You have a small bleed in your head called a sub dural hematoma. Your PCP should repeat a CT of the head in two weeks of hospital discharge to ensure this remains stable.    3) Increase your dose of keppra to 750 mg twice daily. Prescriptions provided.    4) Discuss your hospital stay with PCP and have PCP obtain records for this hospital stay.  5) You are noted to have low blood counts. Have PCP monitor this.    6) Have PCP obtain renal function, electrolytes in one week of hospital discharge.    7) Stop aspirin for now. Resume if repeat CT of the head is stable. Discuss with your PCP.  8) You are noted to have Fluid on lungs called pulmonary edema. Have your PCP repeat a Chest XRAY and consider an echocardiogram if not obtained recently.    9) Monitor your blood pressures at home. New blood pressure medications are added which include Carvedilol 25 mg twice daily (stop metoprolol). In addition we have also added Isosorbide mononitrate. Have PCP continue to monitor your blood pressures.    10) You are noted to have some swallow deficits. You should be on Dysphagia I diet with NECTAR THICK LIQUIDS. Your nursing staff on discharge will discuss dietary modifications with you.     Consultants/Specialty  Neurosurgery - Dr Marti  Trauma - Dr Loco    Disposition  Per patient wishes. HHC to be arranged if agreeable to patient. Home oxygen to be arranged if agreeable to patient.     Time spend FTF with this patient as discussed  above is 115 minutes. Billing [id number] + [id number] + [phone].      Review of Systems   Constitutional: Negative for fever and chills.   HENT: Negative for congestion.    Eyes: Negative for blurred vision and double vision.   Respiratory: Negative for cough, sputum production, shortness of breath and stridor.    Cardiovascular: Negative for chest pain, palpitations and leg swelling.   Gastrointestinal: Negative for heartburn, nausea, vomiting, abdominal pain, diarrhea, constipation, blood in stool and melena.   Genitourinary: Negative for dysuria, urgency, frequency, hematuria and flank pain.   Musculoskeletal: Negative for myalgias and falls.   Skin: Negative for itching and rash.   Neurological: Positive for seizures (Hx of came with). Negative for dizziness, tingling, tremors, sensory change, speech change, focal weakness, loss of consciousness and headaches.   Psychiatric/Behavioral: Negative for depression and suicidal ideas.        Agitation. Rude behavior      Physical Exam  Laboratory/Imaging   Hemodynamics  Temp (24hrs), Av °C (98.6 °F), Min:36.8 °C (98.2 °F), Max:37.2 °C (98.9 °F)   Temperature: 36.9 °C (98.5 °F)  Pulse  Av.4  Min: 49  Max: 109 Heart Rate (Monitored): 65  Blood Pressure : 122/57 mmHg, NIBP: 136/51 mmHg      Respiratory      Respiration: 18, Pulse Oximetry: 91 %     Work Of Breathing / Effort: Mild  RUL Breath Sounds: Clear, RML Breath Sounds: Clear, RLL Breath Sounds: Diminished, JOSE ENRIQUE Breath Sounds: Clear, LLL Breath Sounds: Diminished    Fluids    Intake/Output Summary (Last 24 hours) at 17 1716  Last data filed at 17 1400   Gross per 24 hour   Intake    520 ml   Output   2450 ml   Net  -1930 ml       Nutrition  Orders Placed This Encounter   Procedures   • DIET ORDER     Standing Status: Standing      Number of Occurrences: 1      Standing Expiration Date:      Order Specific Question:  Diet:     Answer:  Regular [1]     Order Specific Question:  Texture/Fiber modifications:      Answer:  Dysphagia 1(Pureed)specify fluid consistency(question 6) [1]     Order Specific Question:  Consistency/Fluid modifications:     Answer:  Nectar Thick [2]     Order Specific Question:  Miscellaneous modifications:     Answer:  SLP - 1:1 Supervision by Nursing [21]     Order Specific Question:  Miscellaneous modifications:     Answer:  SLP - Deliver to Nursing Station [22]     Physical Exam   Constitutional: He is oriented to person, place, and time. He appears well-developed and well-nourished.  Non-toxic appearance. No distress.   HENT:   Head: Normocephalic and atraumatic.   Mouth/Throat: Oropharynx is clear and moist. No oropharyngeal exudate.   Eyes: Conjunctivae are normal. Pupils are equal, round, and reactive to light. No scleral icterus.   Neck: Neck supple. No JVD present. No edema and no erythema present.   Cardiovascular: Normal rate and regular rhythm.    Murmur heard.  Pulmonary/Chest: Effort normal. No stridor. No respiratory distress. He has no wheezes. He has rales (Minimal b/l basal).   Abdominal: Soft. Bowel sounds are normal. He exhibits no distension. There is no tenderness. There is no rebound and no guarding.   Musculoskeletal: He exhibits no edema or tenderness.   Neurological: He is alert and oriented to person, place, and time.   Cognitive deficits. At time irrational behavior. Frustrated to leave the hospital   Skin: Skin is warm and dry. He is not diaphoretic. No erythema.   Psychiatric: He is slowed. Cognition and memory are impaired.   Agitation.    Nursing note and vitals reviewed.      Recent Labs      07/27/17   0200   WBC  10.2   RBC  3.28*   HEMOGLOBIN  11.2*   HEMATOCRIT  32.5*   MCV  99.1*   MCH  34.1*   MCHC  34.5   RDW  46.5   PLATELETCT  177   MPV  9.9     Recent Labs      07/27/17   0200   SODIUM  141   POTASSIUM  3.2*   CHLORIDE  113*   CO2  20   GLUCOSE  117*   BUN  25*   CREATININE  1.01   CALCIUM  8.3*                      Assessment/Plan     * Subdural hematoma  "(CMS-[facility]) (present on admission)  Assessment & Plan  - CT head 07/25/2017 revealing \"Stable appearing left lateral sylvian frontal-temporal hemorrhage most consistent with a small subdural hematoma. Measures 10 mm with no significant change from 7/24/2017. Moderate cerebral atrophy. Old lacunar infarcts. Advanced supratentorial white matter disease most consistent with microvascular ischemic change. Probable old MCA territory infarcts with encephalomalacic change in the right hemisphere primarily in the right frontal operculum. No evidence of new hemorrhage since the prior exam\"  - Continue q4 hour neuro checks  - No surgical interventions per NS team  - Stat CT head if any change in neurological status  - Outpatient CT in two week with PCP  - Keep SBP < 150  - Hold ASA until repeat CT head in outpatient setting    Seizure disorder (CMS-HCC) (present on admission)  Assessment & Plan  - Increased Keppra to 750 mg BID  - Continue phenytoin ER  - Outpatient neurology evaluation post discharge      Uncontrolled hypertension (present on admission)  Assessment & Plan  - Continue baseline regimen of Losartan 100 mg daily and amlodipine 10 mg daily  - Carvedilol 25 mg BID and IMDUR 60 mg added  - Continue to monitor  - Aim SBP < 150  - Continue to titrate regimen to achieve above goals    Pulmonary edema (present on admission)  Assessment & Plan  - Leading to hypoxemia  - Patient refusing further monitoring of BMP or Chest XRAY  - Hold diuresis. If patient is compliant then can further initiate diuresis or recommend on discharge    Acute hypoxemic respiratory failure (CMS-HCC) (present on admission)  Assessment & Plan  - Related to altitude related hypoxia and pulmonary edema  - Patient refusing further blood draws, CXRAY and TTE  - O2 as acceptable to patient  - Arrange O2 on discharge if acceptable to patient    Dyslipidemia (present on admission)  Assessment & Plan  - continue statin    Alzheimer's dementia (present on " admission)  Assessment & Plan  - Avoid sedative, narcotics / BZDs as able      Leukocytosis (present on admission)  Assessment & Plan  - improved, reactive       Hyperglycemia (present on admission)  Assessment & Plan  - mild, no need for coverage    Macrocytosis without anemia (present on admission)  Assessment & Plan  - Would check B12 / Folate but patient refusing blood draws  - With underlying dementia would recommend this  - Recommend PCP review the case    Lactic acidosis (present on admission)  Assessment & Plan  - resolved, not infectious     Hematuria, microscopic (present on admission)  Assessment & Plan  - resolved    Proteinuria (present on admission)  Assessment & Plan  - F/U PCP  - Control HTN    AV block, 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 Billing [id number] + [id number] + [phone].  Labs reviewed, Medications reviewed and Radiology images reviewed        DVT Prophylaxis: Contraindicated - High bleeding risk  DVT prophylaxis - mechanical: SCDs  Ulcer prophylaxis: Yes

## 2017-07-30 NOTE — ASSESSMENT & PLAN NOTE
- Related to altitude related hypoxia and pulmonary edema  - Patient refusing further blood draws, CXRAY and TTE  - O2 as acceptable to patient  - Arrange O2 on discharge if acceptable to patient

## 2017-07-30 NOTE — CARE PLAN
Problem: Safety  Goal: Free from accidental injury  Bed locked in lowest position. Call light within reach. Bed alarm activated.      Problem: Risk of Aspiration  Goal: Absence of aspiration  Adhering to diet recommended by speech     Problem: Elimination  Goal: Regular urinary elimination  Wells DCd during day. Pt voiding

## 2017-07-30 NOTE — PROGRESS NOTES
Regi Almodovar Fall Risk Assessment:     Last Known Fall: Within the last month  Mobility: Immobilized/requires assist of one person  Medications: Cardiovascular or central nervous system meds  Mental Status/LOC/Awareness: Unresponsive/noncompliance with instruction  Toileting Needs: No needs  Volume/Electrolyte Status: No problems  Communication/Sensory: Visual (Glasses)/hearing deficit  Behavior: Impulsiveness  Regi Almodovar Fall Risk Total: 18  Fall Risk Level: HIGH RISK    Universal Fall Precautions:  call light/belongings in reach, wheelchairs and assistive devices out of sight, bed in low position and locked, siderails up x 2, use non-slip footwear, adequate lighting, clutter free and spill free environment, educate on level of risk, educate to call for assistance    Fall Risk Level Interventions:    TRIAL (TELE 8, NEURO, MED YAYA 5) Moderate Fall Risk Interventions  Place yellow fall risk ID band on patient: refused  Provide patient/family education based on risk assessment : completed  Educate patient/family to call staff for assistance when getting out of bed: completed  Place fall precaution signage outside patient door: completed  Utilize bed/chair fall alarm: completedTRIAL (TELE 8, NEURO, [employer] YAYA 5) High Fall Risk Interventions  Place yellow fall risk ID band on patient: refused  Provide patient/family education based on risk assessment: completed  Educate patient/family to call staff for assistance when getting out of bed: completed  Place fall precaution signage outside patient door: completed  Place patient in room close to nursing station: completed  Utilize bed/chair fall alarm: completed  Notify charge of high risk for huddle: completed    Patient Specific Interventions:     Medication: provide patients who received diuretics/laxatives frequent toileting  Mental Status/LOC/Awareness: reinforce falls education  Toileting: provide frquent toileting  Volume/Electrolyte Status: ensure patient  remains hydrated  Communication/Sensory: update plan of care on whiteboard  Behavioral: engage patient in daily activities  Mobility: dangle prior to standing

## 2017-07-31 VITALS
OXYGEN SATURATION: 97 % | TEMPERATURE: 98.1 F | RESPIRATION RATE: 14 BRPM | WEIGHT: 190.04 LBS | SYSTOLIC BLOOD PRESSURE: 152 MMHG | BODY MASS INDEX: 25.74 KG/M2 | DIASTOLIC BLOOD PRESSURE: 73 MMHG | HEIGHT: 72 IN | HEART RATE: 54 BPM

## 2017-07-31 PROCEDURE — 700102 HCHG RX REV CODE 250 W/ 637 OVERRIDE(OP): Performed by: INTERNAL MEDICINE

## 2017-07-31 PROCEDURE — A9270 NON-COVERED ITEM OR SERVICE: HCPCS | Performed by: INTERNAL MEDICINE

## 2017-07-31 PROCEDURE — 92526 ORAL FUNCTION THERAPY: CPT

## 2017-07-31 PROCEDURE — A9270 NON-COVERED ITEM OR SERVICE: HCPCS | Performed by: HOSPITALIST

## 2017-07-31 PROCEDURE — 700102 HCHG RX REV CODE 250 W/ 637 OVERRIDE(OP): Performed by: HOSPITALIST

## 2017-07-31 PROCEDURE — 99239 HOSP IP/OBS DSCHRG MGMT >30: CPT | Performed by: INTERNAL MEDICINE

## 2017-07-31 RX ADMIN — CARVEDILOL 25 MG: 25 TABLET, FILM COATED ORAL at 08:48

## 2017-07-31 RX ADMIN — LEVETIRACETAM 750 MG: 100 SOLUTION ORAL at 11:54

## 2017-07-31 RX ADMIN — LOSARTAN POTASSIUM 100 MG: 50 TABLET, FILM COATED ORAL at 08:48

## 2017-07-31 RX ADMIN — OMEPRAZOLE 20 MG: 20 CAPSULE, DELAYED RELEASE ORAL at 08:48

## 2017-07-31 RX ADMIN — MEMANTINE HYDROCHLORIDE 10 MG: 10 TABLET, FILM COATED ORAL at 08:47

## 2017-07-31 RX ADMIN — ISOSORBIDE MONONITRATE 60 MG: 60 TABLET ORAL at 08:48

## 2017-07-31 RX ADMIN — AMLODIPINE BESYLATE 10 MG: 10 TABLET ORAL at 08:48

## 2017-07-31 ASSESSMENT — PAIN SCALES - GENERAL: PAINLEVEL_OUTOF10: 0

## 2017-07-31 ASSESSMENT — COPD QUESTIONNAIRES
DURING THE PAST 4 WEEKS HOW MUCH DID YOU FEEL SHORT OF BREATH: NONE/LITTLE OF THE TIME
DO YOU EVER COUGH UP ANY MUCUS OR PHLEGM?: NO/ONLY WITH OCCASIONAL COLDS OR INFECTIONS
COPD SCREENING SCORE: 3
HAVE YOU SMOKED AT LEAST 100 CIGARETTES IN YOUR ENTIRE LIFE: YES

## 2017-07-31 NOTE — PROGRESS NOTES
Pt pleasant today, took all of his scheduled medications and ate his meals.  Rested this afternoon.  On  sat'ing 95$ on 4L.  Spoke with wife who was anxious to pick him up tomorrow and anxious about the arrangements.  Requested to speak with MD so gave him her number.  Pt walked to the bathroom and had a BM no issues.  Used walker.  Was very grateful today and no issues with becoming combative or rude.  HR in 60-70s.  BP no longer elevated.

## 2017-07-31 NOTE — PROGRESS NOTES
Regi Almodovar Fall Risk Assessment:     Last Known Fall: Within the last month  Mobility: Dizziness/generalized weakness  Medications: Cardiovascular or central nervous system meds  Mental Status/LOC/Awareness: Awake, alert, and oriented to date, place, and person  Toileting Needs: No needs  Volume/Electrolyte Status: No problems  Communication/Sensory: Visual (Glasses)/hearing deficit  Behavior: Appropriate behavior  Regi Almodovar Fall Risk Total: 9  Fall Risk Level: LOW RISK    Universal Fall Precautions:  call light/belongings in reach, bed in low position and locked, wheelchairs and assistive devices out of sight, siderails up x 2, use non-slip footwear, adequate lighting, clutter free and spill free environment, educate on level of risk, educate to call for assistance    Fall Risk Level Interventions:   TRIAL ([employer], NEURO, MED YAYA 5) Low Fall Risk Interventions  Place yellow fall risk ID band on patient: verified  Provide patient/family education based on risk assessment: verified  Educate patient/family to call staff for assistance when getting out of bed: completed  Place fall precaution signage outside patient door: verifiedTRIAL ([employer] 8, NEURO, MED YAYA 5) Moderate Fall Risk Interventions  Place yellow fall risk ID band on patient: refused  Provide patient/family education based on risk assessment : completed  Educate patient/family to call staff for assistance when getting out of bed: completed  Place fall precaution signage outside patient door: completed  Utilize bed/chair fall alarm: completedTRIAL ([employer] 8, NEURO, [employer] YAYA 5) High Fall Risk Interventions  Place yellow fall risk ID band on patient: refused  Provide patient/family education based on risk assessment: completed  Educate patient/family to call staff for assistance when getting out of bed: completed  Place fall precaution signage outside patient door: completed  Place patient in room close to nursing station: completed  Utilize  bed/chair fall alarm: completed  Notify charge of high risk for huddle: completed    Patient Specific Interventions:     Medication: review medications with patient and family  Mental Status/LOC/Awareness: reinforce falls education, reinforce the use of call light and provide activity  Toileting: monitor intake and output/use of appropriate interventions  Volume/Electrolyte Status: ensure patient remains hydrated  Communication/Sensory: update plan of care on whiteboard  Behavioral: engage patient in daily activities  Mobility: schedule physical activity throughout the day, ensure bed is locked and in lowest position and provide appropriate assistive device

## 2017-07-31 NOTE — PROGRESS NOTES
Regi Almodovar Fall Risk Assessment:     Last Known Fall: Within the last month  Mobility: Dizziness/generalized weakness  Medications: Cardiovascular or central nervous system meds  Mental Status/LOC/Awareness: Awake, alert, and oriented to date, place, and person  Toileting Needs: No needs  Volume/Electrolyte Status: No problems  Communication/Sensory: Visual (Glasses)/hearing deficit  Behavior: Appropriate behavior  Regi Almodovar Fall Risk Total: 9  Fall Risk Level: LOW RISK    Universal Fall Precautions:  call light/belongings in reach, bed in low position and locked, wheelchairs and assistive devices out of sight, siderails up x 2, use non-slip footwear, adequate lighting, clutter free and spill free environment, educate on level of risk, educate to call for assistance    Fall Risk Level Interventions:   TRIAL ([employer], NEURO, MED YAYA 5) Low Fall Risk Interventions  Place yellow fall risk ID band on patient: verified  Provide patient/family education based on risk assessment: verified  Educate patient/family to call staff for assistance when getting out of bed: completed  Place fall precaution signage outside patient door: verifiedTRIAL ([email or web address] 8, NEURO, MED YAYA 5) Moderate Fall Risk Interventions  Place yellow fall risk ID band on patient: refused  Provide patient/family education based on risk assessment : completed  Educate patient/family to call staff for assistance when getting out of bed: completed  Place fall precaution signage outside patient door: completed  Utilize bed/chair fall alarm: completedTRIAL ([email or web address] 8, NEURO, [employer] YAYA 5) High Fall Risk Interventions  Place yellow fall risk ID band on patient: refused  Provide patient/family education based on risk assessment: completed  Educate patient/family to call staff for assistance when getting out of bed: completed  Place fall precaution signage outside patient door: completed  Place patient in room close to nursing station: completed  Utilize  bed/chair fall alarm: completed  Notify charge of high risk for huddle: completed    Patient Specific Interventions:     Medication: review medications with patient and family and limit combination of prn medications  Mental Status/LOC/Awareness: reinforce falls education, check on patient hourly, utilize bed/chair fall alarm and reinforce the use of call light  Toileting: monitor intake and output/use of appropriate interventions, instruct male patients prone to dizziness to void while sitting and instruct patient/family on the need to call for assistance when toileting  Volume/Electrolyte Status: ensure patient remains hydrated  Communication/Sensory: update plan of care on whiteboard, ensure proper positioning when transferrng/ambulating and ensure patient has glasses/contacts and hearing aids/dentures  Behavioral: encourage patient to voice feelings, instruct/reinforce fall program rationale and use appropriate de-escalation techniques  Mobility: dangle prior to standing, utilize bed/chair fall alarm, ensure bed is locked and in lowest position, provide appropriate assistive device and instruct patient to exit bed on their strongest side

## 2017-07-31 NOTE — PROGRESS NOTES
Told to dc tele this afternoon but, left it on because patient will not be discharged until tomorrow and he is having pauses.  Will continue to monitor for any changes.

## 2017-07-31 NOTE — DISCHARGE PLANNING
Pt from [facility] and has [employer]. Pt requires Home O2 and Home Health. Pts spouse, Niya, is currently enroute form [place] with home O2 and should arrive early afternoon. Spoke with [employer] case management and they will be calling the pt once he arrives home for Home Health.

## 2017-07-31 NOTE — DISCHARGE INSTRUCTIONS
Discharge Instructions    Things to follow up after discharge:   1) Follow up with your PCP within one week of hospital discharge.     2) You have a small bleed in your head called a sub dural hematoma. Your PCP should repeat a CT of the head in two weeks of hospital discharge to ensure this remains stable.     3) Increase your dose of keppra to 750 mg twice daily given you had seizures prior to coming to hospital. Prescriptions provided. Discuss with your neurology and PCP.      4) Discuss your hospital stay with PCP and have PCP obtain records for this hospital stay.     5) You are noted to have low blood counts. Have PCP monitor this.     6) Have PCP obtain renal function, electrolytes in one week of hospital discharge.     7) Stop aspirin for now. Resume if repeat CT of the head is stable. Discuss with your PCP.     8) You are noted to have Fluid on lungs called pulmonary edema. Have your PCP repeat a Chest XRAY and consider an echocardiogram if not obtained recently.     9) Monitor your blood pressures at home. New blood pressure medications are added which include Carvedilol 25 mg twice daily (stop metoprolol). In addition we have also added Isosorbide mononitrate. Have PCP continue to monitor your blood pressures.     10) You are noted to have some swallow deficits. You should be on Dysphagia I diet with NECTAR THICK LIQUIDS. Your nursing staff on discharge will discuss dietary modifications with you.   11) Continue oxygen post discharge. Discuss with your PCP. You will need oxygen test performed by your PCP once [place] are [place] sea [place] to see if you need ongoing oxygen.   12) Continue Physical therapy, occupation therapy and speech therapy with your home health team.     DISCHARGE NURSING COMMUNICATION Start: 07/29/17 1312, CONTINUOUS, ROUTINE     Comments: 1) Follow up with your PCP in one week of hospital discharge.     2) You have a small bleed in your head called a sub dural hematoma. Your PCP should  repeat a CT of the head in two weeks of hospital discharge to ensure this remains stable.     3) Increase your dose of keppra to 750 mg twice daily. Prescriptions provided.     4) Discuss your hospital stay with PCP and have PCP obtain records for this hospital stay.     5) You are noted to have low blood counts. Have PCP monitor this.     6) Have PCP obtain renal function, electrolytes in one week of hospital discharge.     7) Stop aspirin for now. Resume if repeat CT of the head is stable. Discuss with your PCP.     8) You are noted to have Fluid on lungs called pulmonary edema. Have your PCP repeat a Chest XRAY and consider an echocardiogram if not obtained recently.     9) Monitor your blood pressures at home. New blood pressure medications are added which include Carvedilol 25 mg twice daily (stop metoprolol). In addition we have also added Isosorbide mononitrate. Have PCP continue to monitor your blood pressures.     10) You are noted to have some swallow deficits. You should be on Dysphagia I diet with NECTAR THICK LIQUIDS. Your nursing staff on discharge will discuss dietary modifications with you.                     Discharged to home by car with relative. Discharged via wheelchair, hospital escort: Yes.  Special equipment needed: Not Applicable    Be sure to schedule a follow-up appointment with your primary care doctor or any specialists as instructed.     Discharge Plan:   Diet Plan: Discussed  Activity Level: Discussed  Confirmed Follow up Appointment: Patient to Call and Schedule Appointment  Confirmed Symptoms Management: Discussed  Medication Reconciliation Updated: Yes  Influenza Vaccine Indication: Not indicated: Previously immunized this influenza season and > 8 years of age, Patient Refuses    I understand that a diet low in cholesterol, fat, and sodium is recommended for good health. Unless I have been given specific instructions below for another diet, I accept this instruction as my diet  prescription.   Other diet: Dysphasia 1 with nectar thickened liquid    Special Instructions: None    · Is patient discharged on Warfarin / Coumadin?   No     · Is patient Post Blood Transfusion?  No    Depression / Suicide Risk    As you are discharged from this [facility] facility, it is important to learn how to keep safe from harming yourself.    Recognize the warning signs:  · Abrupt changes in personality, positive or negative- including increase in energy   · Giving away possessions  · Change in eating patterns- significant weight changes-  positive or negative  · Change in sleeping patterns- unable to sleep or sleeping all the time   · Unwillingness or inability to communicate  · Depression  · Unusual sadness, discouragement and loneliness  · Talk of wanting to die  · Neglect of personal appearance   · Rebelliousness- reckless behavior  · Withdrawal from people/activities they love  · Confusion- inability to concentrate     If you or a loved one observes any of these behaviors or has concerns about self-harm, here's what you can do:  · Talk about it- your feelings and reasons for harming yourself  · Remove any means that you might use to hurt yourself (examples: pills, rope, extension cords, firearm)  · Get professional help from the community (Mental Health, Substance Abuse, psychological counseling)  · Do not be alone:Call your Safe Contact- someone whom you trust who will be there for you.  · Call your local CRISIS HOTLINE 094-8056 or 054-033-8029  · Call your local Children's Mobile Crisis Response Team [place] (702) 337-7422 or www.[employer]ne  · Call the toll free National Suicide Prevention Hotlines   · National Suicide Prevention Lifeline 008-559-PIQQ (8915)  · National Hope Line Network 800-SUICIDE (102-5541)

## 2017-07-31 NOTE — PROGRESS NOTES
Monitor Summary: SB-SR 57-67, NE .24, QRS .12, QT .46 with 1.6 - 1.8 second pauses rare PAC & PVC per strip from monitor room

## 2017-07-31 NOTE — PROGRESS NOTES
Dc instructions provided to pt and family. All lines and monitors dc'd. Pt and family verbalized understanding. Pt to be escorted by wheel chair with all belongings and home 02 to private vehicle .

## 2017-07-31 NOTE — THERAPY
Pt awake, alert and cooperative. Up eating dys1/ntl lunch tray. RN and pt reporting no difficulty with current diet. Pt with no overt s/sx of aspiration noted on trials from current tray. Min cues required for pt to recall swallow strategies. Recommend continue current diet. Pt to DC soon and will need home health or out pt for dysphagia.

## 2017-07-31 NOTE — DISCHARGE SUMMARY
"C O N F I D E N T I A L I N F O R M A T I O N   -------------------------------------------------------------------------------------------------------------------   DISCHARGE SUMMARY    Patient ID:  Alex Bond  [id number]  77 y.o.male  1940    Admit date: 7/24/2017    Discharge date and time: 07/31/2017    Admitting Physician: Lory Lopez M.D.    Discharge Physician: Tan Armstrong    CODE STATUS: FULL CODE    Admission Diagnoses: Epidural hematoma (CMS-[facility])    Discharge Diagnoses:   1) Subdural hematoma  2) Seizure disorder  3) Acute hypoxemic respiratory failure, Persistent. Stable on NC supplemental O2.   4) Dysphagia  5) Pulmonary edema  6) Cardiomegaly evident on CHEST XRAY  7) Uncontrolled HTN, Improved and controlled on discharge  8) Alzheimer's dementia  9) Macrocytic anemia  10) GERD  11) Depression  12) HLD  13) Non compliance with medical treatment  14) Proteinuria  15) Lactic acidosis, POA seizure related.     Admission Condition: critical    Discharged Condition: good    Indication for Admission: SDH . Seizure.     HPI: As noted by Dr Lopez on 7/24/2017 \"77 y.o. male who presented 7/24/2017 with altered level of consciousness. Patient is from [place]. He has known Alzheimer's dementia which has been stable he's on vacation with his wife at [place]. The patient suddenly developed altered level of consciousness he does have history of seizures. He will continue at this point with Keppra and Dilantin. On evaluation of his head with a imaging study he is found to have a subdural hematoma. Neurosurgery has evaluated the patient a do not feel that at this point surgery is indicated. Patient will be admitted to ICU for continued neuro watch. MRI of the brain will be requested for morning\".    Hospital Course: This is a 77 male who was transferred from an [facility] facility to [facility] for further evaluation of seizures and underlying SDH. He was transferred to our ER. Neurosurgical evaluation was " obtained. Patient was evaluated by Dr Marti from neurosurgery and no surgical interventions were recommended. Given fall and concern for traumatic SDH, trauma team (Dr Carr) evaluated the patient and no concerns from his standpoint. Patient was admitted in critical condition to the ICU for close HD monitoring and ongoing monitoring of any changes in neurological status. Interval CT obtained here revealed stability of underlying SDH. Patient's baseline ASA was held. Patient's ICU course was c/b development of uncontrolled HTN / HTN emergency requiring using of nicardipine gtt. During this time PO anti hypertensive regimen was titrated and PO metoprolol transitioned to carvedilol 25 mg BID. In addition IMDUR 60 mg was added. He was noted to be hypoxemic with CXRAY concerning for pulmonary edema. IV diuresis was initiated. Patient was stabilized and transitioned to our neurology RNF. Here patient became agitated and frustrated for not being able to discharge on the weekend when oxygen could not be arranged for discharge by the social workers and patient refused to have any further imaging / echocardiogram or blood draws done during his hospital stay. Given refusal to obtain blood draws we could not any further monitor his electrolytes and renal function with IV diuresis and hence this was discontinued. Cardiac etiologies could not be ruled out given patient's refusal to obtain an echocardiogram. Interval CXRAY could not be obtained either. Patient with persistent hypoxemic respiratory failure, acute in nature. Concern related to altitude related hypoxia but cardiac etiologies could not be ruled. On discharge I have discussed with patient, his spouse and daughter need to f/u with PCP, consider interval CXRAY / Monitoring of renal function & Electrolytes / Echocardiogram and subsequent consideration towards diuresis initiation and potentially cardiology f/u if indicated. They verbalized understanding of this. Patient  should have interval CT completed with PCP in two weeks. Decision to continue for stop ASA per PCP recommendations. In addition he is started on carvedilol & IMDUR and PCP to monitor anti HTN therapy post discharge. Recommend PCP consider checking B12 / Folate levels given underlying dementia to r/o other etiologies. His keppra has been increased to 750 mg BID. He should f/u with his neurologist and consider interval EEG with his neurologist. Patient's wife brought oxygen made available to her through [employer]. I have written orders for home health care for ongoing PT/OT and SLP needs at home. Patient was noted to have dysphagia with signs concerning for silent aspiration. Patient / Wife educated regarding dietary modifications at discharge and advised to continue working closely with SLP. Discharge planning, Hospital course and plan of care on discharge has been discussed in detail by me with the patient, his daughter and spouse. They verbalized understanding of this. Patient being discharged from the hospital in stable condition.     Things to follow up after discharge:   1) Follow up with your PCP within one week of hospital discharge.    2) You have a small bleed in your head called a sub dural hematoma. Your PCP should repeat a CT of the head in two weeks of hospital discharge to ensure this remains stable.    3) Increase your dose of keppra to 750 mg twice daily given you had seizures prior to coming to hospital. Prescriptions provided. Discuss with your neurology and PCP.     4) Discuss your hospital stay with PCP and have PCP obtain records for this hospital stay.    5) You are noted to have low blood counts. Have PCP monitor this.    6) Have PCP obtain renal function, electrolytes in one week of hospital discharge.    7) Stop aspirin for now. Resume if repeat CT of the head is stable. Discuss with your PCP.    8) You are noted to have Fluid on lungs called pulmonary edema. Have your PCP repeat a Chest XRAY  and consider an echocardiogram if not obtained recently.    9) Monitor your blood pressures at home. New blood pressure medications are added which include Carvedilol 25 mg twice daily (stop metoprolol). In addition we have also added Isosorbide mononitrate. Have PCP continue to monitor your blood pressures.    10) You are noted to have some swallow deficits. You should be on Dysphagia I diet with NECTAR THICK LIQUIDS. Your nursing staff on discharge will discuss dietary modifications with you.   11) Continue oxygen post discharge. Discuss with your PCP. You will need oxygen test performed by your PCP once you are back at sea level to see if you need ongoing oxygen.   12) Continue Physical therapy, occupation therapy and speech therapy with your home health team.     Consults: Neurosurgery and Trauma team    Significant Studies:   DX-CHEST-PORTABLE (1 VIEW)   Final Result      1.  Mildly enlarged cardiac silhouette with probable vascular congestion and mild edema.   2.  Lower lobe airspace disease could be atelectasis or edema.      DX-CHEST-PORTABLE (1 VIEW)   Final Result      Moderate interstitial edema.      CT-HEAD W/O   Final Result      1.  Stable appearing left lateral sylvian frontal-temporal hemorrhage most consistent with a small subdural hematoma. Measures 10 mm with no significant change from 7/24/2017.   2.  Moderate cerebral atrophy.   3.  Old lacunar infarcts.   4.  Advanced supratentorial white matter disease most consistent with microvascular ischemic change.   5.  Probable old MCA territory infarcts with encephalomalacic change in the right hemisphere primarily in the right frontal operculum.   6.  No evidence of new hemorrhage since the prior exam.      CT-CSPINE WITHOUT PLUS RECONS   Final Result      1.  No acute abnormality identified.   2.  Multilevel multifactorial degenerative changes   3.  Osteopenia   4.  Atherosclerosis   5.  Probable prior RIGHT carotid endarterectomy       DX-CHEST-PORTABLE (1 VIEW)   Final Result      Cardiomegaly.      OUTSIDE IMAGES-CT HEAD   Preliminary Result          Procedures Performed While Hospitalized: None    Operations During Hospitalization: None    Disposition: Home    Patient Instructions: Given  Activity: activity as tolerated  Diet: As instructed per SLP evaluation  Wound Care: none needed    Medications at discharge:   VarunAlex myles   Home Medication Instructions IRMA:[id number]    Printed on:07/31/17 5165   Medication Information                      amlodipine (NORVASC) 10 MG Tab  Take 10 mg by mouth every day.             atorvastatin (LIPITOR) 80 MG tablet  Take 80 mg by mouth every day.             Calcium Carbonate-Vit D-Min (CALCIUM 1200 PO)  Take 1 Tab by mouth every day.             carvedilol (COREG) 25 MG Tab  Take 1 Tab by mouth 2 times a day, with meals.             cyanocobalamin (VITAMIN B-12) 100 MCG Tab  Take 100 mcg by mouth every day.             isosorbide mononitrate SR (IMDUR) 60 MG TABLET SR 24 HR  Take 1 Tab by mouth every day.             levetiracetam (KEPPRA) 750 MG tablet  Take 1 Tab by mouth 2 Times a Day.             losartan (COZAAR) 100 MG Tab  Take 100 mg by mouth every day.             memantine (NAMENDA) 10 MG Tab  Take 10 mg by mouth 2 times a day.             mirtazapine (REMERON) 15 MG Tab  Take 30 mg by mouth every bedtime.             omeprazole (PRILOSEC) 20 MG delayed-release capsule  Take 20 mg by mouth every day.             phenytoin ER (DILANTIN) 100 MG Cap  Take 600 mg by mouth every evening.                 Opioid prescription history checked: N/A. None prescribed.     Time spend preparing discharge: 50 minutes. This included face to face with the patient, medication reconciliation, care co ordination with Spouse / Daughter involved in patient care and discussion and co ordination with case management.     Signed:  Tan Armstrong  7/31/2017  11:05 AM

## 2017-07-31 NOTE — CARE PLAN
Problem: Oxygenation/Respiratory Function  Goal: Patient will Achieve/Maintain Normal Respiratory Rate/Effort  Outcome: PROGRESSING AS EXPECTED  Plan to dc home with 02

## 2017-11-12 NOTE — PROGRESS NOTES
Shift summary    Patient SR with 1st degree AVB and BBB.  Patient had difficulty swallowing his breakfast and showed signs of choking.  When Dr Giraldo was made aware of the problem.   ordered swallow study and chest x-ray.  After results received by MD.  Lasix and Potassium were ordered and further swallow testing to be carried out on 7/28.  No on 6L O2 oxymask.  Will continue to monitor.   Mother

## 2018-10-17 NOTE — ED NOTES
Petty from Lab called with critical result of lactic acid at 1339 Critical lab result read back to Rachelle.   Dr. Moreno notified of critical lab result at 1340.  Critical lab result read back by    none

## 2022-12-01 NOTE — ASSESSMENT & PLAN NOTE
- Continue baseline regimen of Losartan 100 mg daily and amlodipine 10 mg daily  - Carvedilol 25 mg BID and IMDUR 60 mg added  - Continue to monitor  - Aim SBP < 150  - Continue to titrate regimen to achieve above goals   Clarification on PO meds Clarification on PO meds and inhalers

## 2025-01-29 NOTE — ASSESSMENT & PLAN NOTE
- Asymptomatic  - Monitor  - If issues consider weaning of BB and considering alternative therapy   [FreeTextEntry1] : General Appearance - Well groomed, Not in acute distress. Build & Nutrition - Well nourished.  Integumentary Note: erythema of distal lower extremities  Head and Neck Head - normocephalic, atraumatic with no lesions or palpable masses. Neck Global Assessment - bruit auscultated on the left.  Peripheral Vascular Lower Extremity Palpation - Edema - Left - 2+ Pitting edema - Left.  Neurologic Mental Status Affect - normal. Speech - Dysarthric. Thought content/perception - Normal. Cognitive function - Normal. Cranial Nerves II Optic - Visual fields - Normal. III Oculomotor - Normal Bilaterally. IV Trochlear - Bilateral - Normal - Bilateral. V Trigeminal - Normal Bilaterally. VI Abducens - Bilateral - Normal - Bilateral. VII Facial - Normal Bilaterally. VIII Acoustic - Bilateral - Hearing normal - Bilateral. IX Glossopharyngeal / X Vagus - Normal. XI Accessory - Normal Bilaterally. XII Hypoglossal - Bilateral - Normal - Bilateral. Sensory Light Touch - Decreased - Distal extremities-"stocking-glove" pattern (up to knees, more dense in left more than right L5 dermatome). Note: bilateral L5 dermatomes. Pain - Decreased - Distal extremities-"stocking-glove" pattern (up to mid-thighs, more dense in left more than right L5 dermatome,). Note: bilateral L5 distribution. Temperature - Decreased - Distal extremities-"stocking-glove" pattern (up to knees, more dense in left more than right L5 dermatome). Vibration - Decreased - Distal extremities-"stocking-glove" pattern (up to knees, worse in left side). Proprioception - Bilateral - Toes impaired - Bilateral. Motor Bulk and Contour - Atrophic: Note: left more than right hand intrinsics. Tone - Spastic - Left Upper Extremity (contracted) and Left Lower Extremity. Strength - 5/5 normal muscle strength - Right Upper Extremity. 5-/5 reduced muscle strength - Right Lower Extremity (distal). 4+/5 reduced muscle strength - Left Upper Extremity and Right Lower Extremity (proximal). 4/5 reduced muscle strength - Right Lower Extremity (weaker in knee extension). 4-/5 reduced muscle strength - Note: knee extension. Reflexes (Dermatomes) 0/2 Absent - Right Achilles (L5-S2) and Left Achilles (L5-S2). 1/2 Decreased - Right Knee (L2-4) and Left Knee (L2-4). 3/2 Brisk - Right Bicep (C5-6), Left Bicep (C5-6), Right Brachioradialis (C5-6), Left Brachioradialis (C5-6), Right Triceps (C7-8) and Left Triceps (C7-8). Plantar Reflexes (L4-S2) - Bilateral - Flexion - Bilateral. Coordination - Transfer from sitting to standing with assistance (bilaterally) and Romberg sign positive. Gait - Spastic (needed bilateral support). Frontal Release - Meyerson Sign, Jaw Jerk, Palmar-mental reflex and Grasp reflex.